# Patient Record
Sex: FEMALE | Race: WHITE | NOT HISPANIC OR LATINO | Employment: OTHER | URBAN - METROPOLITAN AREA
[De-identification: names, ages, dates, MRNs, and addresses within clinical notes are randomized per-mention and may not be internally consistent; named-entity substitution may affect disease eponyms.]

---

## 2017-03-08 ENCOUNTER — TELEPHONE (OUTPATIENT)
Dept: OBGYN | Facility: CLINIC | Age: 57
End: 2017-03-08

## 2017-03-08 ASSESSMENT — ANXIETY QUESTIONNAIRES
IF YOU CHECKED OFF ANY PROBLEMS ON THIS QUESTIONNAIRE, HOW DIFFICULT HAVE THESE PROBLEMS MADE IT FOR YOU TO DO YOUR WORK, TAKE CARE OF THINGS AT HOME, OR GET ALONG WITH OTHER PEOPLE: NOT DIFFICULT AT ALL
6. BECOMING EASILY ANNOYED OR IRRITABLE: SEVERAL DAYS
GAD7 TOTAL SCORE: 7
5. BEING SO RESTLESS THAT IT IS HARD TO SIT STILL: NOT AT ALL
1. FEELING NERVOUS, ANXIOUS, OR ON EDGE: MORE THAN HALF THE DAYS
2. NOT BEING ABLE TO STOP OR CONTROL WORRYING: MORE THAN HALF THE DAYS
7. FEELING AFRAID AS IF SOMETHING AWFUL MIGHT HAPPEN: NOT AT ALL
3. WORRYING TOO MUCH ABOUT DIFFERENT THINGS: MORE THAN HALF THE DAYS

## 2017-03-08 ASSESSMENT — PATIENT HEALTH QUESTIONNAIRE - PHQ9: 5. POOR APPETITE OR OVEREATING: NOT AT ALL

## 2017-03-08 NOTE — TELEPHONE ENCOUNTER
Reason for Call:  Medication or medication refill:    Do you use a [facility]?  Name of the pharmacy and phone number for the current request:      Name of the medication requested: patient just has a question regarding her Welbutrin script    Other request: none    Can we leave a detailed message on this number? YES    Phone number patient can be reached at: Home number on file [phone] (home)    Best Time: Patient just has a question regarding her Wellbutrin    Call taken on 3/8/2017 at 2:22 PM by Brooke Ventura

## 2017-03-08 NOTE — TELEPHONE ENCOUNTER
"Pt calling she has been taking Wellbutrin for yrs without any problem, however on Wed & Thurs she forgot to take it. Then last Friday starting having an anxiety attack that was prolonged until yesterday and yesterday it was full-blown and so bad that pt drove herself into the ER ([facility] in [place]). Sx's include: tingling from head to toe, heart palpations, chest tightness. While in the ER they checked some labs (Troponin, CBC and CMP-and stated it was abnormal and didn't know which part was abnormal). Pt stated her Troponin level was .010. BP's that were done in the ER during the anxiety attack timeframe were: 151/86, 148/72, 143/87. They sent her home with Vistaril with instructions that she can take it up to 3 times a day when starts to feel a panic attack coming on. Pt used it once last night and then went to bed for 8 solid hrs. Up until Friday anxiety had been well controlled.     Asked PHQ-9 & RACHEL-7 over the telephone.   PHQ-9 result of: 3, RACHEL-7: result of 7.     ER provider stated they wanted her to follow up with her provider who prescribed the Wellbutrin and that the pt should do a stress test. Today the anxiety has been pretty good, is \"kind of afraid of it,\" and if thinks about it can feel a little chest tightness and can usually so some self talk and then it's fine. Pt has also been taking Plexus for weight loss, a probiotic and biocleanse. Has been doing for a month with no side effects. Had one biocleanse Friday morning and Friday morning is when her heart started racing and tingling in arms/shoulders started, had another one at end day and could feel the anxiety in the middle of the night. Stopped taking the biocleanse Friday and then Sat-Sun was going in and out of waves of anxiety, smaller one in morning yesterday and by 11:00 was driving herself to the hospital. Does have some family/life issues that have been building up that hit its peak. Does have someone to talk to (so isn't keeping " bottled up inside).    Recommended to make an appt with Mirna to discuss. Transferred to scheduling to make appt for sooner rather than later. Pt scheduled appt for tomorrow (3/9/17) at 11:00 AM. Informed pt to call if has concerns come up.  Informed Mirna in person so she's aware.    Closing encounter.

## 2017-03-09 ENCOUNTER — OFFICE VISIT (OUTPATIENT)
Dept: OBGYN | Facility: CLINIC | Age: 57
End: 2017-03-09
Payer: COMMERCIAL

## 2017-03-09 VITALS
SYSTOLIC BLOOD PRESSURE: 114 MMHG | WEIGHT: 155 LBS | BODY MASS INDEX: 25.83 KG/M2 | HEIGHT: 65 IN | HEART RATE: 70 BPM | DIASTOLIC BLOOD PRESSURE: 60 MMHG

## 2017-03-09 DIAGNOSIS — F41.1 GENERALIZED ANXIETY DISORDER: Primary | ICD-10-CM

## 2017-03-09 PROCEDURE — 99214 OFFICE O/P EST MOD 30 MIN: CPT | Performed by: PHYSICIAN ASSISTANT

## 2017-03-09 RX ORDER — PAROXETINE 10 MG/1
10 TABLET, FILM COATED ORAL AT BEDTIME
Qty: 60 TABLET | Refills: 1 | Status: SHIPPED | OUTPATIENT
Start: 2017-03-09 | End: 2017-05-09 | Stop reason: SINTOL

## 2017-03-09 RX ORDER — HYDROXYZINE PAMOATE 25 MG/1
25 CAPSULE ORAL
COMMUNITY
Start: 2017-03-07 | End: 2021-03-26

## 2017-03-09 ASSESSMENT — ANXIETY QUESTIONNAIRES
2. NOT BEING ABLE TO STOP OR CONTROL WORRYING: SEVERAL DAYS
5. BEING SO RESTLESS THAT IT IS HARD TO SIT STILL: NOT AT ALL
IF YOU CHECKED OFF ANY PROBLEMS ON THIS QUESTIONNAIRE, HOW DIFFICULT HAVE THESE PROBLEMS MADE IT FOR YOU TO DO YOUR WORK, TAKE CARE OF THINGS AT HOME, OR GET ALONG WITH OTHER PEOPLE: NOT DIFFICULT AT ALL
1. FEELING NERVOUS, ANXIOUS, OR ON EDGE: SEVERAL DAYS
GAD7 TOTAL SCORE: 4
GAD7 TOTAL SCORE: 7
6. BECOMING EASILY ANNOYED OR IRRITABLE: SEVERAL DAYS
7. FEELING AFRAID AS IF SOMETHING AWFUL MIGHT HAPPEN: NOT AT ALL
3. WORRYING TOO MUCH ABOUT DIFFERENT THINGS: SEVERAL DAYS

## 2017-03-09 ASSESSMENT — PATIENT HEALTH QUESTIONNAIRE - PHQ9
SUM OF ALL RESPONSES TO PHQ QUESTIONS 1-9: 3
5. POOR APPETITE OR OVEREATING: NOT AT ALL

## 2017-03-09 NOTE — PROGRESS NOTES
SUBJECTIVE:                                                   Ann Marie Tidwell is a 56 year old female who presents to clinic today for the following health issue(s):  Patient presents with:  Anxiety: Things have gotten a little better, but wants to discuss Wellbutrin      HPI:  Here for follow up of panic attack earlier this week. She states her symptoms started later last week on Friday, but were more mild. She was able to talk herself down but continued on Saturday.  and Monday were ok, then Tuesday evening she has chest pain, SOB, racing thoughts and drove herself to the ER. Once she was told the EKG was normal, she calmed down and symptoms improved. Full work up was normal at ER.    Has a hx of depression and anxiety and started wellbutrin several years ago. Has been doing well on 300mg dose. She did forget to take if for 2 days prior to start of symptoms. Also around this time, she was taking a diet supplement that has a significant amount of caffeine in it. She had stopped this medication on Saturday, however and had the bad panic attack on Tuesday.     She does have more stress in life right now and situational anxiety. Feels that overall, her moods have shifted more in the direction of anxiety rather than depression/PMS and wonders about a different medication. Still has some intermittent hot flushes and night sweats.    Has tried both celexa and sertraline in past and recalls they stopped working after a while as she recalls.     No LMP recorded. Patient is postmenopausal..   Patient is sexually active, .  Using menopause for contraception.    reports that she has quit smoking. She does not have any smokeless tobacco history on file.    STD testing offered?  Declined    Health maintenance updated:  yes    Today's PHQ-2 Score: No flowsheet data found.  Today's PHQ-9 Score:   PHQ-9 SCORE 3/9/2017   Total Score 2     Today's RACHEL-7 Score:   RACHEL-7 SCORE 3/9/2017   Total Score 4       Problem  "list and histories reviewed & adjusted, as indicated.  Additional history: as documented.    Patient Active Problem List   Diagnosis     Osteopenia     Depression, major, recurrent, in remission (H)     Past Surgical History   Procedure Laterality Date     Laparoscopy diagnostic (gyn)       Tonsillectomy  1963      Social History   Substance Use Topics     Smoking status: Former Smoker     Smokeless tobacco: Not on file      Comment: Quit 3 years ago     Alcohol use Not on file      Problem (# of Occurrences) Relation (Name,Age of Onset)    Alzheimer Disease (1) Father    Breast Cancer (1) Mother (87)    Colon Cancer (1) Father (78): She \"thinks\" it was colon cancer, he was living in [place] at the time.    HEART DISEASE (3) Father (76): 3 stents, Mother (89): CHF,  at age 90, Brother (55): Bilateral thigh stents age 55.  Brother also carotid artery stents age 57.    OSTEOPOROSIS (1) Mother    Thyroid Disease (1) Father: Hypothyroid    Uterine Cancer (1) Mother (88)            Current Outpatient Prescriptions   Medication Sig     PARoxetine (PAXIL) 10 MG tablet Take 1 tablet (10 mg) by mouth At Bedtime     buPROPion (WELLBUTRIN XL) 300 MG 24 hr tablet Take 1 tablet (300 mg) by mouth daily     hydrOXYzine (VISTARIL) 25 MG capsule Take 25 mg by mouth Reported on 3/9/2017     No current facility-administered medications for this visit.      Allergies   Allergen Reactions     Metronidazole      No Clinical Screening - See Comments Rash     Penicillins Rash       ROS:  12 point review of systems negative other than symptoms noted below.  Constitutional: Weight Gain  Eyes: Spots  Head: Nasal Congestion  Cardiovascular: Lightheadedness and Palpitations  Gastrointestinal: Bloating  Genitourinary: Hot Flashes and Night Sweats  Neurologic: Headaches  Psychiatric: Anxiety and Difficulty Sleeping    OBJECTIVE:     /60  Pulse 70  Ht 5' 5\" (1.651 m)  Wt 155 lb (70.3 kg)  BMI 25.79 kg/m2  Body mass index is 25.79 " kg/(m^2).    Exam:  Constitutional:  Appearance: Well nourished, well developed alert, in no acute distress  Neurologic/Psychiatric:  Mental Status:  Oriented X3, affect appropriate     In-Clinic Test Results:  No results found for this or any previous visit (from the past 24 hour(s)).    ASSESSMENT/PLAN:                                                        ICD-10-CM    1. Generalized anxiety disorder F41.1 PARoxetine (PAXIL) 10 MG tablet       Suspect recent panic attack is due to several factors including the diet supplement, situational stress and possibly Wellbutrin which can worsen anxiety. Recommend decreased dose of wellbutrin to 150mg and addition of SSRI, will start with paxil 10mg daily. Will likely need to go to 20mg if tolerating well for therapeutic benefit.   Do not restart the diet supplement.   Continue with regular exercise and other lifestyle changes to help with anxiety.   Ok to take vistaril prn if has another panic attack.     25 minutes spent face to face counseling.     Mirna Martínez PA-C  [facility]

## 2017-03-10 ASSESSMENT — PATIENT HEALTH QUESTIONNAIRE - PHQ9: SUM OF ALL RESPONSES TO PHQ QUESTIONS 1-9: 2

## 2017-03-10 ASSESSMENT — ANXIETY QUESTIONNAIRES: GAD7 TOTAL SCORE: 4

## 2017-05-09 ENCOUNTER — OFFICE VISIT (OUTPATIENT)
Dept: OBGYN | Facility: CLINIC | Age: 57
End: 2017-05-09
Payer: COMMERCIAL

## 2017-05-09 VITALS — BODY MASS INDEX: 26.46 KG/M2 | WEIGHT: 159 LBS | DIASTOLIC BLOOD PRESSURE: 70 MMHG | SYSTOLIC BLOOD PRESSURE: 122 MMHG

## 2017-05-09 DIAGNOSIS — F41.1 GENERALIZED ANXIETY DISORDER: Primary | ICD-10-CM

## 2017-05-09 PROCEDURE — 99212 OFFICE O/P EST SF 10 MIN: CPT | Performed by: NURSE PRACTITIONER

## 2017-05-09 RX ORDER — VENLAFAXINE HYDROCHLORIDE 37.5 MG/1
37.5 CAPSULE, EXTENDED RELEASE ORAL DAILY
Qty: 90 CAPSULE | Refills: 3 | Status: SHIPPED | OUTPATIENT
Start: 2017-05-09 | End: 2018-01-31

## 2017-05-09 ASSESSMENT — ANXIETY QUESTIONNAIRES
2. NOT BEING ABLE TO STOP OR CONTROL WORRYING: NOT AT ALL
1. FEELING NERVOUS, ANXIOUS, OR ON EDGE: NOT AT ALL
7. FEELING AFRAID AS IF SOMETHING AWFUL MIGHT HAPPEN: NOT AT ALL
5. BEING SO RESTLESS THAT IT IS HARD TO SIT STILL: SEVERAL DAYS
IF YOU CHECKED OFF ANY PROBLEMS ON THIS QUESTIONNAIRE, HOW DIFFICULT HAVE THESE PROBLEMS MADE IT FOR YOU TO DO YOUR WORK, TAKE CARE OF THINGS AT HOME, OR GET ALONG WITH OTHER PEOPLE: NOT DIFFICULT AT ALL
3. WORRYING TOO MUCH ABOUT DIFFERENT THINGS: NOT AT ALL
6. BECOMING EASILY ANNOYED OR IRRITABLE: NOT AT ALL
GAD7 TOTAL SCORE: 1

## 2017-05-09 ASSESSMENT — PATIENT HEALTH QUESTIONNAIRE - PHQ9: 5. POOR APPETITE OR OVEREATING: NOT AT ALL

## 2017-05-09 NOTE — MR AVS SNAPSHOT
"              After Visit Summary   2017    Ann Marie Tidwell    MRN: [id number]           Patient Information     Date Of Birth          1960        Visit Information        Provider Department      2017 11:00 AM Cecilia Dai APRN CNP [facility]        Today's Diagnoses     Generalized anxiety disorder    -  1       Follow-ups after your visit        Who to contact     If you have questions or need follow up information about today's clinic visit or your schedule please contact [facility] directly at [phone].  Normal or non-critical lab and imaging results will be communicated to you by hikehart, letter or phone within 4 business days after the clinic has received the results. If you do not hear from us within 7 days, please contact the clinic through hikehart or phone. If you have a critical or abnormal lab result, we will notify you by phone as soon as possible.  Submit refill requests through [employer] or call your pharmacy and they will forward the refill request to us. Please allow 3 business days for your refill to be completed.          Additional Information About Your Visit        MyChart Information     [employer] lets you send messages to your doctor, view your test results, renew your prescriptions, schedule appointments and more. To sign up, go to [email or web address] . Click on \"Log in\" on the left side of the screen, which will take you to the Welcome page. Then click on \"Sign up Now\" on the right side of the page.     You will be asked to enter the access code listed below, as well as some personal information. Please follow the directions to create your username and password.     Your access code is: [email or web address]  Expires: 2017 12:54 PM     Your access code will  in 90 days. If you need help or a new code, please call your [place] clinic or [phone].        Care EveryWhere ID     This is your Care EveryWhere ID. This could " be used by other organizations to access your [facility] medical records  [id number]        Your Vitals Were     Breastfeeding? BMI (Body Mass Index)                No 26.46 kg/m2           Blood Pressure from Last 3 Encounters:   05/09/17 122/70   03/09/17 114/60   06/06/16 118/76    Weight from Last 3 Encounters:   05/09/17 159 lb (72.1 kg)   03/09/17 155 lb (70.3 kg)   06/06/16 146 lb (66.2 kg)              Today, you had the following     No orders found for display         Today's Medication Changes          These changes are accurate as of: 5/9/17 11:48 AM.  If you have any questions, ask your nurse or doctor.               Start taking these medicines.        Dose/Directions    venlafaxine 37.5 MG 24 hr capsule   Commonly known as:  EFFEXOR-XR   Used for:  Generalized anxiety disorder   Started by:  Cecilia Dai APRN CNP        Dose:  37.5 mg   Take 1 capsule (37.5 mg) by mouth daily   Quantity:  90 capsule   Refills:  3         Stop taking these medicines if you haven't already. Please contact your care team if you have questions.     PARoxetine 10 MG tablet   Commonly known as:  PAXIL   Stopped by:  Cecilia Dai APRN CNP                Where to get your medicines      These medications were sent to [facility] [address] [place] [address] N AT [facility] & [facility]  [address] [facility] [address]     Phone:  [phone]     venlafaxine 37.5 MG 24 hr capsule                Primary Care Provider Office Phone # Fax #    Mirna Martínez PA-C [phone]       [facility] [address] [address]        Thank you!     Thank you for choosing [facility]  for your care. Our goal is always to provide you with excellent care. Hearing back from our patients is one way we can continue to improve our services. Please take a few minutes to complete the written survey that you may receive in the  mail after your visit with us. Thank you!             Your Updated Medication List - Protect others around you: Learn how to safely use, store and throw away your medicines at www.disposemymeds.org.          This list is accurate as of: 5/9/17 11:48 AM.  Always use your most recent med list.                   Brand Name Dispense Instructions for use    buPROPion 300 MG 24 hr tablet    WELLBUTRIN XL    90 tablet    Take 1 tablet (300 mg) by mouth daily       hydrOXYzine 25 MG capsule    VISTARIL     Take 25 mg by mouth Reported on 5/9/2017       venlafaxine 37.5 MG 24 hr capsule    EFFEXOR-XR    90 capsule    Take 1 capsule (37.5 mg) by mouth daily

## 2017-05-09 NOTE — PROGRESS NOTES
"    SUBJECTIVE:                                                   Ann Marie Tidwell is a 56 year old female who presents to clinic today for the following health issue(s):  Patient presents with:  Recheck Medication      HPI:  Pt was started on paxil 10 mg back in march. Her anxiety is better, however she has noticed weight gain and this is bothersome to her.     She has tried celexa and sertraline in the past, but they stopped working for her. She is still taking wellbutrin daily. Most of her anxiety comes from stress of her 23yo daughter and college and having P.O.T.S.    She is walking, she just got a puppy, she is active. She is sleeping better now on Paxil.    No LMP recorded. Patient is postmenopausal..   Patient is sexually active, .  Using menopause for contraception.    reports that she has quit smoking. She has never used smokeless tobacco.    STD testing offered?  Declined    Health maintenance updated:  yes    Today's PHQ-2 Score: No flowsheet data found.  Today's PHQ-9 Score:   PHQ-9 SCORE 2017   Total Score 1     Today's RACHEL-7 Score:   RACHEL-7 SCORE 2017   Total Score 1       Problem list and histories reviewed & adjusted, as indicated.  Additional history: as documented.    Patient Active Problem List   Diagnosis     Osteopenia     Depression, major, recurrent, in remission (H)     Past Surgical History:   Procedure Laterality Date     LAPAROSCOPY DIAGNOSTIC (GYN)       TONSILLECTOMY  1963      Social History   Substance Use Topics     Smoking status: Former Smoker     Smokeless tobacco: Never Used      Comment: Quit 3 years ago     Alcohol use 0.0 oz/week     0 Standard drinks or equivalent per week      Problem (# of Occurrences) Relation (Name,Age of Onset)    Alzheimer Disease (1) Father    Breast Cancer (1) Mother (87)    Colon Cancer (1) Father (78): She \"thinks\" it was colon cancer, he was living in [place] at the time.    HEART DISEASE (3) Father (76): 3 stents, Mother (89): " CHF,  at age 90, Brother (55): Bilateral thigh stents age 55.  Brother also carotid artery stents age 57.    OSTEOPOROSIS (1) Mother    Thyroid Disease (1) Father: Hypothyroid    Uterine Cancer (1) Mother (88)            Current Outpatient Prescriptions   Medication Sig     venlafaxine (EFFEXOR-XR) 37.5 MG 24 hr capsule Take 1 capsule (37.5 mg) by mouth daily     buPROPion (WELLBUTRIN XL) 300 MG 24 hr tablet Take 1 tablet (300 mg) by mouth daily     hydrOXYzine (VISTARIL) 25 MG capsule Take 25 mg by mouth Reported on 2017     No current facility-administered medications for this visit.      Allergies   Allergen Reactions     Metronidazole      No Clinical Screening - See Comments Rash     Penicillins Rash       ROS:  12 point review of systems negative other than symptoms noted below.  Constitutional: Weight Gain  Head: Nasal Congestion  Cardiovascular: Lightheadedness    OBJECTIVE:     /70  Wt 159 lb (72.1 kg)  Breastfeeding? No  BMI 26.46 kg/m2  Body mass index is 26.46 kg/(m^2).    Exam:  Constitutional:  Appearance: Well nourished, well developed alert, in no acute distress  Neurologic/Psychiatric:  Mental Status:  Oriented X3      In-Clinic Test Results:  No results found for this or any previous visit (from the past 24 hour(s)).    ASSESSMENT/PLAN:                                                        ICD-10-CM    1. Generalized anxiety disorder F41.1 venlafaxine (EFFEXOR-XR) 37.5 MG 24 hr capsule       Discussed SSRI's and weight gain. Will try effexor. RTC in 2017 for annual exam.    ALEX Delatorre CNP  [facility]

## 2017-05-10 ASSESSMENT — ANXIETY QUESTIONNAIRES: GAD7 TOTAL SCORE: 1

## 2017-05-10 ASSESSMENT — PATIENT HEALTH QUESTIONNAIRE - PHQ9: SUM OF ALL RESPONSES TO PHQ QUESTIONS 1-9: 1

## 2017-06-24 ENCOUNTER — HEALTH MAINTENANCE LETTER (OUTPATIENT)
Age: 57
End: 2017-06-24

## 2017-09-10 DIAGNOSIS — F33.1 MODERATE EPISODE OF RECURRENT MAJOR DEPRESSIVE DISORDER (H): ICD-10-CM

## 2017-09-12 RX ORDER — BUPROPION HYDROCHLORIDE 300 MG/1
TABLET ORAL
Qty: 30 TABLET | Refills: 0 | Status: SHIPPED | OUTPATIENT
Start: 2017-09-12 | End: 2017-11-17

## 2017-09-12 NOTE — TELEPHONE ENCOUNTER
Bupropion 300mg      Last Written Prescription Date:  6/6/16  Last Fill Quantity: 90,   # refills: 3  Last Office Visit with [facility] primary care provider:  6/6/16  Future Office visit: none    Pt due for annual, no appt scheduled. One month extension sent per protocol.

## 2017-09-21 ENCOUNTER — TELEPHONE (OUTPATIENT)
Dept: OBGYN | Facility: CLINIC | Age: 57
End: 2017-09-21

## 2017-09-21 DIAGNOSIS — R30.0 DYSURIA: Primary | ICD-10-CM

## 2017-09-21 RX ORDER — NITROFURANTOIN 25; 75 MG/1; MG/1
100 CAPSULE ORAL 2 TIMES DAILY
Qty: 14 CAPSULE | Refills: 0 | Status: SHIPPED | OUTPATIENT
Start: 2017-09-21 | End: 2018-01-31

## 2017-09-21 NOTE — TELEPHONE ENCOUNTER
Reason for Call:  Other call back    Detailed comments: Patient would like to speak to Cecilia about meds    Phone Number Patient can be reached at: Cell number on file:    Telephone Information:   Mobile [phone]       Best Time: today    Can we leave a detailed message on this number? YES    Call taken on 9/21/2017 at 12:56 PM by Brooke Ventura

## 2017-09-21 NOTE — TELEPHONE ENCOUNTER
Pt called with burning with urination, urgency and this morning she noticed some blood in her urine.     She fell and broke her arm on 9/5 and had surgery last week. She tried to get a hold of her surgeon but was told he was running behind and she may not hear back until tomorrow. She is requesting abx today.Encouraged her to increase her water intake, I will send over abx and she has her post op with her MD on 10/2/17 she will have her urine checked then. She is to call with any fever/chills.

## 2017-10-12 DIAGNOSIS — F33.1 MODERATE EPISODE OF RECURRENT MAJOR DEPRESSIVE DISORDER (H): ICD-10-CM

## 2017-10-12 RX ORDER — BUPROPION HYDROCHLORIDE 300 MG/1
TABLET ORAL
Qty: 30 TABLET | Refills: 0 | OUTPATIENT
Start: 2017-10-12

## 2017-10-12 NOTE — TELEPHONE ENCOUNTER
buPROPion (WELLBUTRIN XL) 300 MG 24 hr tablet     Last Written Prescription Date:  9/12/17  Last Fill Quantity: 30,   # refills: 0  Last Office Visit with [facility] primary care provider:  6/6/16 Annual  Future Office visit: none    Routing refill request to provider for review/approval because:  Refill denied. Pt has received one month extension, no appointment made for annual

## 2017-11-17 ENCOUNTER — TELEPHONE (OUTPATIENT)
Dept: NURSING | Facility: CLINIC | Age: 57
End: 2017-11-17

## 2017-11-17 DIAGNOSIS — F33.1 MODERATE EPISODE OF RECURRENT MAJOR DEPRESSIVE DISORDER (H): ICD-10-CM

## 2017-11-17 RX ORDER — BUPROPION HYDROCHLORIDE 300 MG/1
TABLET ORAL
Qty: 30 TABLET | Refills: 0 | OUTPATIENT
Start: 2017-11-17

## 2017-11-17 RX ORDER — BUPROPION HYDROCHLORIDE 300 MG/1
TABLET ORAL
Qty: 30 TABLET | Refills: 0 | Status: SHIPPED | OUTPATIENT
Start: 2017-11-17 | End: 2018-01-31

## 2017-11-17 NOTE — TELEPHONE ENCOUNTER
Bupropion 300mg      Last Written Prescription Date:  9/12/17  Last Fill Quantity: 30,   # refills: 0  Last Office Visit with [facility] primary care provider:  6/6/16  Future Office visit: none    Pt due for annual, no appt scheduled. Pt already received one month extension. Rx denied.

## 2017-11-17 NOTE — TELEPHONE ENCOUNTER
Pt requesting refill of wellbutrin. Pt informed that she is overdue for annual and needs appointment. Pt transferred to scheduling to make appointment. Refill sent. Appointment 12/12/17

## 2017-12-16 ENCOUNTER — HEALTH MAINTENANCE LETTER (OUTPATIENT)
Age: 57
End: 2017-12-16

## 2017-12-23 ENCOUNTER — HEALTH MAINTENANCE LETTER (OUTPATIENT)
Age: 57
End: 2017-12-23

## 2017-12-23 DIAGNOSIS — F33.1 MODERATE EPISODE OF RECURRENT MAJOR DEPRESSIVE DISORDER (H): ICD-10-CM

## 2017-12-26 RX ORDER — BUPROPION HYDROCHLORIDE 300 MG/1
TABLET ORAL
Qty: 30 TABLET | Refills: 0 | OUTPATIENT
Start: 2017-12-26

## 2017-12-26 NOTE — TELEPHONE ENCOUNTER
buPROPion (WELLBUTRIN XL) 300 MG 24 hr tablet     Last Written Prescription Date:  11/17/17  Last Fill Quantity: 30,   # refills: 0  Last Office Visit with [facility] primary care provider:  6/6/16  Future Office visit: 12/29/17    Routing refill request to provider for review/approval because:  Pt can get refill at appointment 12/29/17. Refill denied.

## 2018-01-31 ENCOUNTER — RADIANT APPOINTMENT (OUTPATIENT)
Dept: MAMMOGRAPHY | Facility: CLINIC | Age: 58
End: 2018-01-31
Payer: COMMERCIAL

## 2018-01-31 ENCOUNTER — OFFICE VISIT (OUTPATIENT)
Dept: OBGYN | Facility: CLINIC | Age: 58
End: 2018-01-31
Payer: COMMERCIAL

## 2018-01-31 VITALS
SYSTOLIC BLOOD PRESSURE: 118 MMHG | HEIGHT: 65 IN | HEART RATE: 80 BPM | BODY MASS INDEX: 26.99 KG/M2 | DIASTOLIC BLOOD PRESSURE: 74 MMHG | WEIGHT: 162 LBS

## 2018-01-31 DIAGNOSIS — Z12.31 VISIT FOR SCREENING MAMMOGRAM: ICD-10-CM

## 2018-01-31 DIAGNOSIS — F33.1 MODERATE EPISODE OF RECURRENT MAJOR DEPRESSIVE DISORDER (H): ICD-10-CM

## 2018-01-31 DIAGNOSIS — Z12.11 SCREEN FOR COLON CANCER: ICD-10-CM

## 2018-01-31 DIAGNOSIS — Z01.419 ENCOUNTER FOR GYNECOLOGICAL EXAMINATION WITHOUT ABNORMAL FINDING: Primary | ICD-10-CM

## 2018-01-31 DIAGNOSIS — F41.1 GENERALIZED ANXIETY DISORDER: ICD-10-CM

## 2018-01-31 PROCEDURE — G0145 SCR C/V CYTO,THINLAYER,RESCR: HCPCS | Performed by: NURSE PRACTITIONER

## 2018-01-31 PROCEDURE — 99396 PREV VISIT EST AGE 40-64: CPT | Performed by: NURSE PRACTITIONER

## 2018-01-31 PROCEDURE — 77067 SCR MAMMO BI INCL CAD: CPT | Mod: TC

## 2018-01-31 PROCEDURE — 77063 BREAST TOMOSYNTHESIS BI: CPT | Mod: TC

## 2018-01-31 PROCEDURE — 87624 HPV HI-RISK TYP POOLED RSLT: CPT | Performed by: NURSE PRACTITIONER

## 2018-01-31 RX ORDER — BUPROPION HYDROCHLORIDE 150 MG/1
TABLET ORAL
Qty: 90 TABLET | Refills: 3 | Status: SHIPPED | OUTPATIENT
Start: 2018-01-31 | End: 2019-03-19

## 2018-01-31 RX ORDER — VENLAFAXINE HYDROCHLORIDE 37.5 MG/1
37.5 CAPSULE, EXTENDED RELEASE ORAL DAILY
Qty: 90 CAPSULE | Refills: 3 | Status: SHIPPED | OUTPATIENT
Start: 2018-01-31 | End: 2019-02-15

## 2018-01-31 ASSESSMENT — ANXIETY QUESTIONNAIRES
GAD7 TOTAL SCORE: 0
3. WORRYING TOO MUCH ABOUT DIFFERENT THINGS: NOT AT ALL
7. FEELING AFRAID AS IF SOMETHING AWFUL MIGHT HAPPEN: NOT AT ALL
IF YOU CHECKED OFF ANY PROBLEMS ON THIS QUESTIONNAIRE, HOW DIFFICULT HAVE THESE PROBLEMS MADE IT FOR YOU TO DO YOUR WORK, TAKE CARE OF THINGS AT HOME, OR GET ALONG WITH OTHER PEOPLE: NOT DIFFICULT AT ALL
5. BEING SO RESTLESS THAT IT IS HARD TO SIT STILL: NOT AT ALL
6. BECOMING EASILY ANNOYED OR IRRITABLE: NOT AT ALL
1. FEELING NERVOUS, ANXIOUS, OR ON EDGE: NOT AT ALL
2. NOT BEING ABLE TO STOP OR CONTROL WORRYING: NOT AT ALL

## 2018-01-31 ASSESSMENT — PATIENT HEALTH QUESTIONNAIRE - PHQ9: 5. POOR APPETITE OR OVEREATING: NOT AT ALL

## 2018-01-31 NOTE — MR AVS SNAPSHOT
"              After Visit Summary   1/31/2018    Ann Marie Tidwell    MRN: [id number]           Patient Information     Date Of Birth          1960        Visit Information        Provider Department      1/31/2018 1:30 PM Cecilia Dai APRN CNP [facility]        Today's Diagnoses     Encounter for gynecological examination without abnormal finding    -  1    Moderate episode of recurrent major depressive disorder (H)        Generalized anxiety disorder        Screen for colon cancer           Follow-ups after your visit        Follow-up notes from your care team     Return in about 1 year (around 1/31/2019).      Future tests that were ordered for you today     Open Future Orders        Priority Expected Expires Ordered    Fecal colorectal cancer screen (FIT) Routine 2/21/2018 4/25/2018 1/31/2018            Who to contact     If you have questions or need follow up information about today's clinic visit or your schedule please contact [facility] directly at [phone].  Normal or non-critical lab and imaging results will be communicated to you by [employer], letter or phone within 4 business days after the clinic has received the results. If you do not hear from us within 7 days, please contact the clinic through [employer] or phone. If you have a critical or abnormal lab result, we will notify you by phone as soon as possible.  Submit refill requests through [employer] or call your pharmacy and they will forward the refill request to us. Please allow 3 business days for your refill to be completed.          Additional Information About Your Visit        [employer] Information     [employer] lets you send messages to your doctor, view your test results, renew your prescriptions, schedule appointments and more. To sign up, go to [email or web address] . Click on \"Log in\" on the left side of the screen, which will take you to the Welcome page. Then click on \"Sign up Now\" on the " "right side of the page.     You will be asked to enter the access code listed below, as well as some personal information. Please follow the directions to create your username and password.     Your access code is: [email or web address]  Expires: 2018  2:40 PM     Your access code will  in 90 days. If you need help or a new code, please call your [facility] or [phone].        Care EveryWhere ID     This is your Care EveryWhere ID. This could be used by other organizations to access your [place] medical records  [id number]        Your Vitals Were     Pulse Height Breastfeeding? BMI (Body Mass Index)          80 5' 4.5\" (1.638 m) No 27.38 kg/m2         Blood Pressure from Last 3 Encounters:   18 118/74   17 122/70   17 114/60    Weight from Last 3 Encounters:   18 162 lb (73.5 kg)   17 159 lb (72.1 kg)   17 155 lb (70.3 kg)              We Performed the Following     HPV High Risk Types DNA Cervical     Pap imaged thin layer screen with HPV - recommended age 30 - 65          Today's Medication Changes          These changes are accurate as of 18  2:40 PM.  If you have any questions, ask your nurse or doctor.               These medicines have changed or have updated prescriptions.        Dose/Directions    buPROPion 150 MG 24 hr tablet   Commonly known as:  WELLBUTRIN XL   This may have changed:    - medication strength  - additional instructions   Used for:  Moderate episode of recurrent major depressive disorder (H)   Changed by:  Cecilia Dai APRN CNP        TAKE 1 TABLET(150 MG) BY MOUTH DAILY   Quantity:  90 tablet   Refills:  3            Where to get your medicines      These medications were sent to [facility] [address] [facility] [place] - [address] AT [facility]  [address], [facility] [address]     Phone:  [phone]     buPROPion 150 MG 24 hr tablet    venlafaxine 37.5 MG 24 hr capsule                " Primary Care Provider Office Phone # Fax #    [facility] [phone]       [facility] [address]  [facility] [address]        Equal Access to Services     CHILO VO : Hadii sharlene slater hadlouiseo Soomaali, waaxda luqadaha, qaybta kaalmada adeegyada, brandy jeanie charlotten tremaine frankiremberto asencio. So [facility] [phone].    ATENCIÓN: Si habla español, tiene a rothman disposición servicios gratuitos de asistencia lingüística. Llame al [phone].    We comply with applicable federal civil rights laws and [place] laws. We do not discriminate on the basis of race, color, national origin, age, disability, sex, sexual orientation, or gender identity.            Thank you!     Thank you for choosing [facility]  for your care. Our goal is always to provide you with excellent care. Hearing back from our patients is one way we can continue to improve our services. Please take a few minutes to complete the written survey that you may receive in the mail after your visit with us. Thank you!             Your Updated Medication List - Protect others around you: Learn how to safely use, store and throw away your medicines at www.disposemymeds.org.          This list is accurate as of 1/31/18  2:40 PM.  Always use your most recent med list.                   Brand Name Dispense Instructions for use Diagnosis    buPROPion 150 MG 24 hr tablet    WELLBUTRIN XL    90 tablet    TAKE 1 TABLET(150 MG) BY MOUTH DAILY    Moderate episode of recurrent major depressive disorder (H)       hydrOXYzine 25 MG capsule    VISTARIL     Take 25 mg by mouth Reported on 5/9/2017        venlafaxine 37.5 MG 24 hr capsule    EFFEXOR-XR    90 capsule    Take 1 capsule (37.5 mg) by mouth daily    Generalized anxiety disorder

## 2018-01-31 NOTE — LETTER
February 7, 2018    Ann Marie Tidwell  [address]  [place] [address]    Dear Ann Marie,  We are happy to inform you that your PAP smear result from 1/31/18 is normal.  We are now able to do a follow up test on PAP smears. The DNA test is for HPV (Human Papilloma Virus). Cervical cancer is closely linked with certain types of HPV. Your result showed no evidence of high risk HPV.  Therefore we recommend you return in 1 year for your next pap smear.  You will still need to return to the clinic every year for an annual exam and other preventive tests.  Please contact the clinic at [phone] with any questions.  Sincerely,    Cecilia Dai, ALEX CNP/rlm

## 2018-02-01 ASSESSMENT — ANXIETY QUESTIONNAIRES: GAD7 TOTAL SCORE: 0

## 2018-02-01 ASSESSMENT — PATIENT HEALTH QUESTIONNAIRE - PHQ9: SUM OF ALL RESPONSES TO PHQ QUESTIONS 1-9: 0

## 2018-02-02 LAB
COPATH REPORT: NORMAL
PAP: NORMAL

## 2018-02-06 LAB
FINAL DIAGNOSIS: NORMAL
HPV HR 12 DNA CVX QL NAA+PROBE: NEGATIVE
HPV16 DNA SPEC QL NAA+PROBE: NEGATIVE
HPV18 DNA SPEC QL NAA+PROBE: NEGATIVE
SPECIMEN DESCRIPTION: NORMAL
SPECIMEN SOURCE CVX/VAG CYTO: NORMAL

## 2018-05-31 ENCOUNTER — TELEPHONE (OUTPATIENT)
Dept: NURSING | Facility: CLINIC | Age: 58
End: 2018-05-31

## 2018-05-31 DIAGNOSIS — R30.0 DYSURIA: Primary | ICD-10-CM

## 2018-05-31 RX ORDER — NITROFURANTOIN 25; 75 MG/1; MG/1
100 CAPSULE ORAL 2 TIMES DAILY
Qty: 14 CAPSULE | Refills: 0 | Status: SHIPPED | OUTPATIENT
Start: 2018-05-31 | End: 2018-09-06

## 2018-05-31 NOTE — TELEPHONE ENCOUNTER
"Pt requesting rx for her \"raging bladder infection\" SHe said that she woke up with dysuria, blood in urine, low back pain, frequency.  Pt aware that she may need to come in for urine lab appt. ( [facility] [address])  Routing to Cecilia RAMIREZ to advise  "

## 2018-05-31 NOTE — TELEPHONE ENCOUNTER
Called pt back. Will rx today. If symptoms persist or worsen she needs to be seen. Encouraged increase H2O intake

## 2018-09-05 ENCOUNTER — TELEPHONE (OUTPATIENT)
Dept: OBGYN | Facility: CLINIC | Age: 58
End: 2018-09-05

## 2018-09-05 DIAGNOSIS — R30.0 DYSURIA: ICD-10-CM

## 2018-09-05 NOTE — TELEPHONE ENCOUNTER
Reason for call:  Patient reporting a symptom    Symptom or request: Patient is having UTI symptoms and is hoping you can give her a script without coming in. She will be scheduling a pre-op next week.    Duration (how long have symptoms been present):2 days    Have you been treated for this before? Yes    Additional comments: none    Phone Number patient can be reached at:  Cell number on file:    Telephone Information:   Mobile [phone]       Best Time:  today    Can we leave a detailed message on this number:  YES    Call taken on 9/5/2018 at 3:46 PM by Brooke Ventura

## 2018-09-06 RX ORDER — NITROFURANTOIN 25; 75 MG/1; MG/1
100 CAPSULE ORAL 2 TIMES DAILY
Qty: 14 CAPSULE | Refills: 0 | Status: SHIPPED | OUTPATIENT
Start: 2018-09-06 | End: 2019-07-11

## 2018-09-06 NOTE — TELEPHONE ENCOUNTER
Called pt back.     Symptoms started Monday. She's noticed blood in urine, frequency, lower back pain, chills.     Has been taking D-Mannose which has helped a bit but symptoms are still nagging.     RX sent. If symptoms return after abx she needs to be seen.

## 2018-09-13 ENCOUNTER — TRANSFERRED RECORDS (OUTPATIENT)
Dept: HEALTH INFORMATION MANAGEMENT | Facility: CLINIC | Age: 58
End: 2018-09-13

## 2018-09-25 ENCOUNTER — OFFICE VISIT (OUTPATIENT)
Dept: OBGYN | Facility: CLINIC | Age: 58
End: 2018-09-25
Payer: COMMERCIAL

## 2018-09-25 VITALS
HEIGHT: 64 IN | WEIGHT: 163 LBS | HEART RATE: 68 BPM | BODY MASS INDEX: 27.83 KG/M2 | SYSTOLIC BLOOD PRESSURE: 118 MMHG | DIASTOLIC BLOOD PRESSURE: 74 MMHG

## 2018-09-25 DIAGNOSIS — L65.9 HAIR LOSS: ICD-10-CM

## 2018-09-25 DIAGNOSIS — S63.591S: ICD-10-CM

## 2018-09-25 DIAGNOSIS — Z01.818 PREOP EXAMINATION: Primary | ICD-10-CM

## 2018-09-25 PROCEDURE — 36415 COLL VENOUS BLD VENIPUNCTURE: CPT | Performed by: NURSE PRACTITIONER

## 2018-09-25 PROCEDURE — 84443 ASSAY THYROID STIM HORMONE: CPT | Performed by: NURSE PRACTITIONER

## 2018-09-25 PROCEDURE — 82728 ASSAY OF FERRITIN: CPT | Performed by: NURSE PRACTITIONER

## 2018-09-25 PROCEDURE — 99213 OFFICE O/P EST LOW 20 MIN: CPT | Performed by: NURSE PRACTITIONER

## 2018-09-25 RX ORDER — BUPROPION HYDROCHLORIDE 300 MG/1
TABLET ORAL
Refills: 0 | COMMUNITY
Start: 2017-11-17 | End: 2019-07-11

## 2018-09-25 NOTE — LETTER
9/26/2018     Ann Marie Tidwell  [address]  [place] [address]        Ann Marie Tidwell your lab results came back normal. Your thyroid and iron level were perfect.      Results for orders placed or performed in visit on 09/25/18   TSH with free T4 reflex   Result Value Ref Range    TSH 1.31 0.40 - 4.00 mU/L   Ferritin   Result Value Ref Range    Ferritin 98 8 - 252 ng/mL         Cristino,    ALEX Delatorre CNP

## 2018-09-25 NOTE — MR AVS SNAPSHOT
"              After Visit Summary   2018    Ann Marie Tidwell    MRN: [id number]           Patient Information     Date Of Birth          1960        Visit Information        Provider Department      2018 2:00 PM Cecilia Dai APRN CNP [facility]        Today's Diagnoses     Preop examination    -  1    Sprain of ulnar collateral ligament of right wrist, sequela        Hair loss           Follow-ups after your visit        Who to contact     If you have questions or need follow up information about today's clinic visit or your schedule please contact [facility] directly at [phone].  Normal or non-critical lab and imaging results will be communicated to you by [employer], letter or phone within 4 business days after the clinic has received the results. If you do not hear from us within 7 days, please contact the clinic through [employer] or phone. If you have a critical or abnormal lab result, we will notify you by phone as soon as possible.  Submit refill requests through [employer] or call your pharmacy and they will forward the refill request to us. Please allow 3 business days for your refill to be completed.          Additional Information About Your Visit        MyChart Information     [employer] lets you send messages to your doctor, view your test results, renew your prescriptions, schedule appointments and more. To sign up, go to [email or web address] . Click on \"Log in\" on the left side of the screen, which will take you to the Welcome page. Then click on \"Sign up Now\" on the right side of the page.     You will be asked to enter the access code listed below, as well as some personal information. Please follow the directions to create your username and password.     Your access code is: [email or web address]  Expires: 2018  2:08 PM     Your access code will  in 90 days. If you need help or a new code, please call your [place] clinic or " "[phone].        Care EveryWhere ID     This is your Care EveryWhere ID. This could be used by other organizations to access your [place] medical records  [id number]        Your Vitals Were     Pulse Height BMI (Body Mass Index)             68 5' 4\" (1.626 m) 27.98 kg/m2          Blood Pressure from Last 3 Encounters:   09/25/18 118/74   01/31/18 118/74   05/09/17 122/70    Weight from Last 3 Encounters:   09/25/18 163 lb (73.9 kg)   01/31/18 162 lb (73.5 kg)   05/09/17 159 lb (72.1 kg)              We Performed the Following     Ferritin     TSH with free T4 reflex        Primary Care Provider Office Phone # Fax #    [facility] [phone]       [place] [address]  [facility] [address]  [facility] [address]        Equal Access to Services     CHILO VO AH: Hadii sharlene ku hadasho Soomaali, waaxda luqadaha, qaybta kaalmada adeegyada, brandy corbett . So [facility] [phone].    ATENCIÓN: Si adolfola español, tiene a rothman disposición servicios gratuitos de asistencia lingüística. Llame al [phone].    We comply with applicable federal civil rights laws and [place] laws. We do not discriminate on the basis of race, color, national origin, age, disability, sex, sexual orientation, or gender identity.            Thank you!     Thank you for choosing [facility]  for your care. Our goal is always to provide you with excellent care. Hearing back from our patients is one way we can continue to improve our services. Please take a few minutes to complete the written survey that you may receive in the mail after your visit with us. Thank you!             Your Updated Medication List - Protect others around you: Learn how to safely use, store and throw away your medicines at www.disposemymeds.org.          This list is accurate as of 9/25/18  3:58 PM.  Always use your most recent med list.                   Brand " Name Dispense Instructions for use Diagnosis    * buPROPion 300 MG 24 hr tablet    WELLBUTRIN XL          * buPROPion 150 MG 24 hr tablet    WELLBUTRIN XL    90 tablet    TAKE 1 TABLET(150 MG) BY MOUTH DAILY    Moderate episode of recurrent major depressive disorder (H)       hydrOXYzine 25 MG capsule    VISTARIL     Take 25 mg by mouth Reported on 5/9/2017        nitroFURantoin (macrocrystal-monohydrate) 100 MG capsule    MACROBID    14 capsule    Take 1 capsule (100 mg) by mouth 2 times daily    Dysuria       venlafaxine 37.5 MG 24 hr capsule    EFFEXOR-XR    90 capsule    Take 1 capsule (37.5 mg) by mouth daily    Generalized anxiety disorder       * Notice:  This list has 2 medication(s) that are the same as other medications prescribed for you. Read the directions carefully, and ask your doctor or other care provider to review them with you.

## 2018-09-25 NOTE — PROGRESS NOTES
"    SUBJECTIVE:                                                   Ann Marie Tidwell is a 58 year old female who presents to clinic today for the following health issue(s):  Patient presents with:  Pre-Op Exam      HPI:  Pt here today for a preop exam for a right wrist surgery she has planned with Dr. Walker on 2018. She is having a DRUJ-ligament reconstruction on her ulna and a styloid excision.  She had the same procedure done in 2017 with no complications. Denies any post op N/V or major issues with constipation.     Unrelated she is requesting testing for thyroid and iron. She has noticed her hair is falling out, her skin is dryer and her palms are itchy.     No LMP recorded. Patient is postmenopausal..   Patient is sexually active, .  Using menopause for contraception.    reports that she has quit smoking. She has never used smokeless tobacco.    STD testing offered?  Declined    Health maintenance updated:  yes    Today's PHQ-2 Score: No flowsheet data found.  Today's PHQ-9 Score:   PHQ-9 SCORE 2018   Total Score 0     Today's RACHEL-7 Score:   RACHEL-7 SCORE 2018   Total Score 0       Problem list and histories reviewed & adjusted, as indicated.  Additional history: as documented.    Patient Active Problem List   Diagnosis     Osteopenia     Depression, major, recurrent, in remission (H)     Past Surgical History:   Procedure Laterality Date     LAPAROSCOPY DIAGNOSTIC (GYN)       TONSILLECTOMY  1963      Social History   Substance Use Topics     Smoking status: Former Smoker     Smokeless tobacco: Never Used      Comment: Quit 3 years ago     Alcohol use 0.0 oz/week     0 Standard drinks or equivalent per week      Problem (# of Occurrences) Relation (Name,Age of Onset)    Alzheimer Disease (1) Father    Breast Cancer (1) Mother (87)    Colon Cancer (1) Father (78): She \"thinks\" it was colon cancer, he was living in [place] at the time.    HEART DISEASE (3) Father (76): 3 stents, " "Mother (89): CHF,  at age 90, Brother (55): Bilateral thigh stents age 55.  Brother also carotid artery stents age 57.    Osteoporosis (1) Mother    Thyroid Disease (1) Father: Hypothyroid    Uterine Cancer (1) Mother (88)            Current Outpatient Prescriptions   Medication Sig     buPROPion (WELLBUTRIN XL) 150 MG 24 hr tablet TAKE 1 TABLET(150 MG) BY MOUTH DAILY     buPROPion (WELLBUTRIN XL) 300 MG 24 hr tablet      hydrOXYzine (VISTARIL) 25 MG capsule Take 25 mg by mouth Reported on 2017     nitroFURantoin, macrocrystal-monohydrate, (MACROBID) 100 MG capsule Take 1 capsule (100 mg) by mouth 2 times daily     venlafaxine (EFFEXOR-XR) 37.5 MG 24 hr capsule Take 1 capsule (37.5 mg) by mouth daily     No current facility-administered medications for this visit.      Allergies   Allergen Reactions     Metronidazole      No Clinical Screening - See Comments Rash     Penicillins Rash       ROS:  12 point review of systems negative other than symptoms noted below.  Constitutional: Weight Gain  Head: Nasal Congestion and Sore Throat  Skin: Skin Dryness  Musculoskeletal: Joint Pain    OBJECTIVE:     /74  Pulse 68  Ht 5' 4\" (1.626 m)  Wt 163 lb (73.9 kg)  BMI 27.98 kg/m2  Body mass index is 27.98 kg/(m^2).    Exam:  Constitutional:  Appearance: Well nourished, well developed alert, in no acute distress  Neck:  Lymph Nodes:  No lymphadenopathy present; Thyroid:  Gland size normal, nontender, no nodules or masses present on palpation  Chest:  Respiratory Effort:  Breathing unlabored  Cardiovascular: Heart: Auscultation:  Regular rate, normal rhythm, no murmurs present  Lymphatic: Lymph Nodes:  No other lymphadenopathy present  Neurologic/Psychiatric:  Mental Status:  Oriented X3   No Pelvic Exam performed     In-Clinic Test Results:  No results found for this or any previous visit (from the past 24 hour(s)).    ASSESSMENT/PLAN:                                                        ICD-10-CM    1. Preop " examination Z01.818    2. Sprain of ulnar collateral ligament of right wrist, sequela S63.251S    3. Hair loss L65.9 TSH with free T4 reflex     Ferritin         Pt cleared for surgery. Consent per DR. Walker.    Will update her on her labs.     ALEX Delatorre [facility] FOR [facility]

## 2018-09-26 LAB
FERRITIN SERPL-MCNC: 98 NG/ML (ref 8–252)
TSH SERPL DL<=0.005 MIU/L-ACNC: 1.31 MU/L (ref 0.4–4)

## 2019-02-15 DIAGNOSIS — F41.1 GENERALIZED ANXIETY DISORDER: ICD-10-CM

## 2019-02-18 RX ORDER — VENLAFAXINE HYDROCHLORIDE 37.5 MG/1
CAPSULE, EXTENDED RELEASE ORAL
Qty: 30 CAPSULE | Refills: 0 | Status: SHIPPED | OUTPATIENT
Start: 2019-02-18 | End: 2019-05-06

## 2019-02-18 NOTE — TELEPHONE ENCOUNTER
"Requested Prescriptions   Pending Prescriptions Disp Refills     venlafaxine (EFFEXOR-XR) 37.5 MG 24 hr capsule [Pharmacy Med Name: VENLAFAXINE ER 37.5MG CAPSULES] 90 capsule 0     Sig: TAKE 1 CAPSULE(37.5 MG) BY MOUTH DAILY    Serotonin-Norepinephrine Reuptake Inhibitors  Failed - 2/15/2019  5:13 PM       Failed - Normal serum creatinine on file in past 12 months    No lab results found.         Passed - Blood pressure under 140/90 in past 12 months    BP Readings from Last 3 Encounters:   09/25/18 118/74   01/31/18 118/74   05/09/17 122/70                Passed - Recent (12 mo) or future (30 days) visit within the authorizing provider's specialty    Patient had office visit in the last 12 months or has a visit in the next 30 days with authorizing provider or within the authorizing provider's specialty.  See \"Patient Info\" tab in inbasket, or \"Choose Columns\" in Meds & Orders section of the refill encounter.             Passed - Medication is active on med list       Passed - Patient is age 18 or older       Passed - No active pregnancy on record       Passed - No positive pregnancy test in past 12 months        Last Written Prescription Date:  1/31/18  Last Fill Quantity: 90,  # refills: 3   Last office visit: 9/25/2018 with prescribing provider:  Cecilia Dai   Future Office Visit: none    Medication is being filled for 1 time refill only due to:  pt needs an appointment for further refills   Tamara Gonzalez RN on 2/18/2019 at 11:44 AM            "

## 2019-03-19 DIAGNOSIS — F33.1 MODERATE EPISODE OF RECURRENT MAJOR DEPRESSIVE DISORDER (H): ICD-10-CM

## 2019-03-19 RX ORDER — BUPROPION HYDROCHLORIDE 150 MG/1
TABLET ORAL
Qty: 30 TABLET | Refills: 0 | Status: SHIPPED | OUTPATIENT
Start: 2019-03-19 | End: 2019-07-11

## 2019-03-19 NOTE — TELEPHONE ENCOUNTER
"Requested Prescriptions   Pending Prescriptions Disp Refills     buPROPion (WELLBUTRIN XL) 150 MG 24 hr tablet [Pharmacy Med Name: &BUPROPION 150MG ER TAB] 90 tablet 0     Sig: TAKE 1 TABLET(150 MG) BY MOUTH DAILY    SSRIs Protocol Failed - 3/19/2019 12:16 PM       Failed - PHQ-9 score less than 5 in past 6 months    Please review last PHQ-9 score.          Passed - Medication is Bupropion    If the medication is Bupropion (Wellbutrin), and the patient is taking for smoking cessation; OK to refill.         Passed - Medication is active on med list       Passed - Patient is age 18 or older       Passed - No active pregnancy on record       Passed - No positive pregnancy test in last 12 months       Passed - Recent (6 mo) or future (30 days) visit within the authorizing provider's specialty    Patient had office visit in the last 6 months or has a visit in the next 30 days with authorizing provider or within the authorizing provider's specialty.  See \"Patient Info\" tab in inbasket, or \"Choose Columns\" in Meds & Orders section of the refill encounter.            Medication is being filled for 1 time refill only due to:  pt is due for an annual exam   Tamara Gonzalez RN on 3/19/2019 at 12:34 PM      "

## 2019-04-21 DIAGNOSIS — F41.1 GENERALIZED ANXIETY DISORDER: ICD-10-CM

## 2019-04-21 DIAGNOSIS — F33.1 MODERATE EPISODE OF RECURRENT MAJOR DEPRESSIVE DISORDER (H): ICD-10-CM

## 2019-04-22 NOTE — TELEPHONE ENCOUNTER
"Requested Prescriptions   Pending Prescriptions Disp Refills     venlafaxine (EFFEXOR-XR) 37.5 MG 24 hr capsule [Pharmacy Med Name: VENLAFAXINE ER 37.5MG CAPSULES] 30 capsule 0     Sig: TAKE 1 CAPSULE(37.5 MG) BY MOUTH DAILY       Serotonin-Norepinephrine Reuptake Inhibitors  Failed - 4/21/2019  8:55 PM        Failed - PHQ-9 score of less than 5 in past 6 months     Please review last PHQ-9 score.           Failed - Normal serum creatinine on file in past 12 months     No lab results found.          Failed - Recent (6 mo) or future (30 days) visit within the authorizing provider's specialty     Patient had office visit in the last 6 months or has a visit in the next 30 days with authorizing provider or within the authorizing provider's specialty.  See \"Patient Info\" tab in inbasket, or \"Choose Columns\" in Meds & Orders section of the refill encounter.            Passed - Blood pressure under 140/90 in past 12 months     BP Readings from Last 3 Encounters:   09/25/18 118/74   01/31/18 118/74   05/09/17 122/70                 Passed - Medication is active on med list        Passed - Patient is age 18 or older        Passed - No active pregnancy on record        Passed - No positive pregnancy test in past 12 months        buPROPion (WELLBUTRIN XL) 150 MG 24 hr tablet [Pharmacy Med Name: BUPROPION XL 150MG TABLETS (24 H)] 30 tablet 0     Sig: TAKE 1 TABLET BY MOUTH EVERY DAY       SSRIs Protocol Failed - 4/21/2019  8:55 PM        Failed - PHQ-9 score less than 5 in past 6 months     Please review last PHQ-9 score.           Failed - Recent (6 mo) or future (30 days) visit within the authorizing provider's specialty     Patient had office visit in the last 6 months or has a visit in the next 30 days with authorizing provider or within the authorizing provider's specialty.  See \"Patient Info\" tab in inbasket, or \"Choose Columns\" in Meds & Orders section of the refill encounter.            Passed - Medication is Bupropion "     If the medication is Bupropion (Wellbutrin), and the patient is taking for smoking cessation; OK to refill.          Passed - Medication is active on med list        Passed - Patient is age 18 or older        Passed - No active pregnancy on record        Passed - No positive pregnancy test in last 12 months        VENLAFAXINE  Last Written Prescription Date:  2/18/19  Last Fill Quantity: 30,  # refills: 0   Last office visit: 9/25/2018 with prescribing provider:  Cecilia Saeed Office Visit:  none    BUPROPION  Last Written Prescription Date:  3/19/19  Last Fill Quantity: 30,  # refills: 0   Last office visit: 9/25/2018 with prescribing provider:  Cecilia Saeed Office Visit:  none    Pt due for annual, no appt scheduled. Pt already received one month extension. Rx denied.   Tamara Gonzalez RN on 4/23/2019 at 9:10 AM

## 2019-04-23 RX ORDER — BUPROPION HYDROCHLORIDE 150 MG/1
TABLET ORAL
Qty: 30 TABLET | Refills: 0 | OUTPATIENT
Start: 2019-04-23

## 2019-04-23 RX ORDER — VENLAFAXINE HYDROCHLORIDE 37.5 MG/1
CAPSULE, EXTENDED RELEASE ORAL
Qty: 30 CAPSULE | Refills: 0 | OUTPATIENT
Start: 2019-04-23

## 2019-05-03 DIAGNOSIS — F41.1 GENERALIZED ANXIETY DISORDER: ICD-10-CM

## 2019-05-03 RX ORDER — VENLAFAXINE HYDROCHLORIDE 37.5 MG/1
CAPSULE, EXTENDED RELEASE ORAL
Qty: 30 CAPSULE | Refills: 0 | OUTPATIENT
Start: 2019-05-03

## 2019-05-03 NOTE — TELEPHONE ENCOUNTER
"Requested Prescriptions   Pending Prescriptions Disp Refills     venlafaxine (EFFEXOR-XR) 37.5 MG 24 hr capsule [Pharmacy Med Name: VENLAFAXINE ER 37.5MG CAPSULES] 30 capsule 0     Sig: TAKE 1 CAPSULE(37.5 MG) BY MOUTH DAILY       Serotonin-Norepinephrine Reuptake Inhibitors  Failed - 5/3/2019 12:41 PM        Failed - Normal serum creatinine on file in past 12 months     No lab results found.          Passed - Blood pressure under 140/90 in past 12 months     BP Readings from Last 3 Encounters:   09/25/18 118/74   01/31/18 118/74   05/09/17 122/70                 Passed - Recent (12 mo) or future (30 days) visit within the authorizing provider's specialty     Patient had office visit in the last 12 months or has a visit in the next 30 days with authorizing provider or within the authorizing provider's specialty.  See \"Patient Info\" tab in inbasket, or \"Choose Columns\" in Meds & Orders section of the refill encounter.              Passed - Medication is active on med list        Passed - Patient is age 18 or older        Passed - No active pregnancy on record        Passed - No positive pregnancy test in past 12 months        Pt due for annual, no appt scheduled. Pt already received one month extension. Rx denied.   Tamara Gonzalez RN on 5/3/2019 at 12:48 PM    "

## 2019-05-06 ENCOUNTER — TELEPHONE (OUTPATIENT)
Dept: OBGYN | Facility: CLINIC | Age: 59
End: 2019-05-06

## 2019-05-06 DIAGNOSIS — F41.1 GENERALIZED ANXIETY DISORDER: ICD-10-CM

## 2019-05-06 RX ORDER — VENLAFAXINE HYDROCHLORIDE 37.5 MG/1
37.5 CAPSULE, EXTENDED RELEASE ORAL DAILY
Qty: 60 CAPSULE | Refills: 0 | Status: SHIPPED | OUTPATIENT
Start: 2019-05-06 | End: 2019-07-05

## 2019-05-06 NOTE — TELEPHONE ENCOUNTER
Requested Prescriptions   Pending Prescriptions Disp Refills     venlafaxine (EFFEXOR-XR) 37.5 MG 24 hr capsule 60 capsule 0     Sig: Take 1 capsule (37.5 mg) by mouth daily       There is no refill protocol information for this order        Last Written Prescription Date:  2/18/19  Last Fill Quantity: 30,  # refills: 0   Last office visit: 9/25/2018 with prescribing provider:  Cecilia Dai   Future Office Visit:   Next 5 appointments (look out 90 days)    Jun 11, 2019 11:30 AM CDT  [facility] [address] [place] [id number] [phone]         Medication is being filled for 1 time refill only due to:  Patient needs to be seen because it has been more than one year since last visit. has annual scheduled with PRIYANKA Hassan NP

## 2019-07-05 DIAGNOSIS — F41.1 GENERALIZED ANXIETY DISORDER: ICD-10-CM

## 2019-07-08 RX ORDER — VENLAFAXINE HYDROCHLORIDE 37.5 MG/1
CAPSULE, EXTENDED RELEASE ORAL
Qty: 60 CAPSULE | Refills: 0 | Status: SHIPPED | OUTPATIENT
Start: 2019-07-08 | End: 2019-07-11

## 2019-07-08 NOTE — TELEPHONE ENCOUNTER
"Requested Prescriptions   Pending Prescriptions Disp Refills     venlafaxine (EFFEXOR-XR) 37.5 MG 24 hr capsule [Pharmacy Med Name: VENLAFAXINE ER 37.5MG CAPSULES] 60 capsule 0     Sig: TAKE 1 CAPSULE(37.5 MG) BY MOUTH DAILY       Serotonin-Norepinephrine Reuptake Inhibitors  Failed - 7/5/2019  5:54 PM        Failed - Normal serum creatinine on file in past 12 months     No lab results found.          Passed - Blood pressure under 140/90 in past 12 months     BP Readings from Last 3 Encounters:   09/25/18 118/74   01/31/18 118/74   05/09/17 122/70                 Passed - Recent (12 mo) or future (30 days) visit within the authorizing provider's specialty     Patient had office visit in the last 12 months or has a visit in the next 30 days with authorizing provider or within the authorizing provider's specialty.  See \"Patient Info\" tab in inbasket, or \"Choose Columns\" in Meds & Orders section of the refill encounter.              Passed - Medication is active on med list        Passed - Patient is age 18 or older        Passed - No active pregnancy on record        Passed - No positive pregnancy test in past 12 months      Last Written Prescription Date:  5/6/19  Last Fill Quantity: 60,  # refills: 0   Last office visit: 9/25/2018 with prescribing provider:  Suhas   Future Office Visit:   Next 5 appointments (look out 90 days)    Jul 11, 2019 10:00 AM CDT  PHYSICAL with ALEX Donahue CNP  [facility] ([facility]) [address] [place] [id number] [phone]       Prescription approved per [facility].  "

## 2019-07-11 ENCOUNTER — ANCILLARY PROCEDURE (OUTPATIENT)
Dept: MAMMOGRAPHY | Facility: CLINIC | Age: 59
End: 2019-07-11
Attending: NURSE PRACTITIONER
Payer: COMMERCIAL

## 2019-07-11 ENCOUNTER — OFFICE VISIT (OUTPATIENT)
Dept: OBGYN | Facility: CLINIC | Age: 59
End: 2019-07-11
Attending: NURSE PRACTITIONER
Payer: COMMERCIAL

## 2019-07-11 VITALS
SYSTOLIC BLOOD PRESSURE: 110 MMHG | WEIGHT: 159.8 LBS | HEIGHT: 65 IN | BODY MASS INDEX: 26.62 KG/M2 | DIASTOLIC BLOOD PRESSURE: 64 MMHG | HEART RATE: 70 BPM

## 2019-07-11 DIAGNOSIS — Z13.29 SCREENING FOR THYROID DISORDER: ICD-10-CM

## 2019-07-11 DIAGNOSIS — F33.1 MODERATE EPISODE OF RECURRENT MAJOR DEPRESSIVE DISORDER (H): ICD-10-CM

## 2019-07-11 DIAGNOSIS — Z12.31 VISIT FOR SCREENING MAMMOGRAM: ICD-10-CM

## 2019-07-11 DIAGNOSIS — Z01.419 ENCOUNTER FOR GYNECOLOGICAL EXAMINATION WITHOUT ABNORMAL FINDING: Primary | ICD-10-CM

## 2019-07-11 DIAGNOSIS — F41.1 GENERALIZED ANXIETY DISORDER: ICD-10-CM

## 2019-07-11 DIAGNOSIS — Z13.228 SCREENING FOR METABOLIC DISORDER: ICD-10-CM

## 2019-07-11 DIAGNOSIS — Z13.6 SCREENING FOR CARDIOVASCULAR CONDITION: ICD-10-CM

## 2019-07-11 PROCEDURE — 87624 HPV HI-RISK TYP POOLED RSLT: CPT | Performed by: NURSE PRACTITIONER

## 2019-07-11 PROCEDURE — 84443 ASSAY THYROID STIM HORMONE: CPT | Performed by: NURSE PRACTITIONER

## 2019-07-11 PROCEDURE — G0145 SCR C/V CYTO,THINLAYER,RESCR: HCPCS | Performed by: NURSE PRACTITIONER

## 2019-07-11 PROCEDURE — 77063 BREAST TOMOSYNTHESIS BI: CPT | Mod: TC

## 2019-07-11 PROCEDURE — 99396 PREV VISIT EST AGE 40-64: CPT | Performed by: NURSE PRACTITIONER

## 2019-07-11 PROCEDURE — 80053 COMPREHEN METABOLIC PANEL: CPT | Performed by: NURSE PRACTITIONER

## 2019-07-11 PROCEDURE — 80061 LIPID PANEL: CPT | Performed by: NURSE PRACTITIONER

## 2019-07-11 PROCEDURE — 77067 SCR MAMMO BI INCL CAD: CPT | Mod: TC

## 2019-07-11 PROCEDURE — 36415 COLL VENOUS BLD VENIPUNCTURE: CPT | Performed by: NURSE PRACTITIONER

## 2019-07-11 RX ORDER — VENLAFAXINE HYDROCHLORIDE 37.5 MG/1
37.5 CAPSULE, EXTENDED RELEASE ORAL DAILY
Qty: 90 CAPSULE | Refills: 3 | Status: SHIPPED | OUTPATIENT
Start: 2019-07-11 | End: 2020-09-08

## 2019-07-11 RX ORDER — BUPROPION HYDROCHLORIDE 150 MG/1
150 TABLET ORAL EVERY MORNING
Qty: 90 TABLET | Refills: 3 | Status: SHIPPED | OUTPATIENT
Start: 2019-07-11 | End: 2020-07-15

## 2019-07-11 ASSESSMENT — ANXIETY QUESTIONNAIRES
6. BECOMING EASILY ANNOYED OR IRRITABLE: NOT AT ALL
GAD7 TOTAL SCORE: 0
7. FEELING AFRAID AS IF SOMETHING AWFUL MIGHT HAPPEN: NOT AT ALL
1. FEELING NERVOUS, ANXIOUS, OR ON EDGE: NOT AT ALL
3. WORRYING TOO MUCH ABOUT DIFFERENT THINGS: NOT AT ALL
IF YOU CHECKED OFF ANY PROBLEMS ON THIS QUESTIONNAIRE, HOW DIFFICULT HAVE THESE PROBLEMS MADE IT FOR YOU TO DO YOUR WORK, TAKE CARE OF THINGS AT HOME, OR GET ALONG WITH OTHER PEOPLE: NOT DIFFICULT AT ALL
2. NOT BEING ABLE TO STOP OR CONTROL WORRYING: NOT AT ALL
5. BEING SO RESTLESS THAT IT IS HARD TO SIT STILL: NOT AT ALL

## 2019-07-11 ASSESSMENT — PATIENT HEALTH QUESTIONNAIRE - PHQ9
SUM OF ALL RESPONSES TO PHQ QUESTIONS 1-9: 1
5. POOR APPETITE OR OVEREATING: NOT AT ALL

## 2019-07-11 ASSESSMENT — MIFFLIN-ST. JEOR: SCORE: 1290.73

## 2019-07-11 NOTE — PROGRESS NOTES
Ann Marie is a 59 year old  female who presents for annual exam.     Besides routine health maintenance, she has no other health concerns today .    HPI: here for annual exam.  In menopause, no HRT.  Has no concerns today.    The patient's PCP is  [facility].        GYNECOLOGIC HISTORY:    No LMP recorded. Patient is postmenopausal.  Her current contraception method is: menopause.  She  reports that she has quit smoking. She has never used smokeless tobacco.    Patient is sexually active.  STD testing offered?  Declined  Last PHQ-9 score on record =   PHQ-9 SCORE 2019   PHQ-9 Total Score 1     Last GAD7 score on record =   RACHEL-7 SCORE 2019   Total Score 0     Alcohol Score = 1    HEALTH MAINTENANCE:  Cholesterol: 2016   Total= 182, Triglycerides=168, HDL=74, LDL=74  Cholesterol   Date Value Ref Range Status   2016 182 <200 mg/dL Final   2015 163 115 - 199 mg/dL Final      Last Mammo: 2018, Result: normal, Next Mammo: today   Pap: 2018 NIL, HPV-  Lab Results   Component Value Date    PAP NIL 2018      Colonoscopy:  NA, Result: not applicable, Next Colonoscopy: Due.  Dexa:  2015    Health maintenance updated:  yes    HISTORY:  OB History    Para Term  AB Living   4 3 1 2 1 3   SAB TAB Ectopic Multiple Live Births   1 0 0 0 3      # Outcome Date GA Lbr Doug/2nd Weight Sex Delivery Anes PTL Lv   4  95 31w0d  1.871 kg (4 lb 2 oz) F    KATTY      Name: Sonia   3 Term 87 39w0d  3.544 kg (7 lb 13 oz) M    KATTY      Name: Ted   2  85 35w0d  2.608 kg (5 lb 12 oz) F Vag-Vacuum   KATTY      Birth Comments: PPROM.  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      Name: Elizabeth   1 SAB                Patient Active Problem List   Diagnosis     Osteopenia     Depression, major, recurrent, in remission (H)     Past Surgical History:   Procedure Laterality Date     LAPAROSCOPY DIAGNOSTIC (GYN)        "TONSILLECTOMY  1963      Social History     Tobacco Use     Smoking status: Former Smoker     Smokeless tobacco: Never Used     Tobacco comment: Quit 3 years ago   Substance Use Topics     Alcohol use: Yes     Alcohol/week: 0.0 oz     Comment: Occas       Problem (# of Occurrences) Relation (Name,Age of Onset)    Alzheimer Disease (1) Father    Breast Cancer (1) Mother (87)    Colon Cancer (1) Father (78): She \"thinks\" it was colon cancer, he was living in [place] at the time.    Heart Disease (3) Father (76): 3 stents, Mother (89): CHF,  at age 90, Brother (55): Bilateral thigh stents age 55.  Brother also carotid artery stents age 57.    Osteoporosis (1) Mother    Thyroid Disease (1) Father: Hypothyroid    Uterine Cancer (1) Mother (88)            Current Outpatient Medications   Medication Sig     buPROPion (WELLBUTRIN XL) 150 MG 24 hr tablet Take 1 tablet (150 mg) by mouth every morning     hydrOXYzine (VISTARIL) 25 MG capsule Take 25 mg by mouth Reported on 2017     UNABLE TO FIND MEDICATION NAME: Modere Burn and Modere Carb Blocker     UNABLE TO FIND MEDICATION NAME: Plexus Bio Cleanse     venlafaxine (EFFEXOR-XR) 37.5 MG 24 hr capsule Take 1 capsule (37.5 mg) by mouth daily     No current facility-administered medications for this visit.      Allergies   Allergen Reactions     Metronidazole      No Clinical Screening - See Comments Rash     Penicillins Rash       Past medical, surgical, social and family histories were reviewed and updated in EPIC.    ROS:   12 point review of systems negative other than symptoms noted below.  Constitutional: Weight Gain  Head: Nasal Congestion  Genitourinary: No Periods  Skin: Skin Dryness  Psychiatric: Anxiety and Difficulty Sleeping    EXAM:  /64   Pulse 70   Ht 1.651 m (5' 5\")   Wt 72.5 kg (159 lb 12.8 oz)   Breastfeeding? No   BMI 26.59 kg/m     BMI: Body mass index is 26.59 kg/m .    PHYSICAL EXAM:  Constitutional:  Appearance: Well nourished, well " developed, alert, in no acute distress  Neck:  Lymph Nodes:  No lymphadenopathy present    Thyroid:  Gland size normal, nontender, no nodules or masses present  on palpation  Chest:  Respiratory Effort:  Breathing unlabored  Cardiovascular:    Heart: Auscultation:  Regular rate, normal rhythm, no murmurs present  Breasts: Inspection of Breasts:  No lymphadenopathy present., Palpation of Breasts and Axillae:  No masses present on palpation, no breast tenderness., Axillary Lymph Nodes:  No lymphadenopathy present. and No nodularity, asymmetry or nipple discharge bilaterally.  Gastrointestinal:   Abdominal Examination:  Abdomen nontender to palpation, tone normal without rigidity or guarding, no masses present, umbilicus without lesions   Liver and Spleen:  No hepatomegaly present, liver nontender to palpation    Hernias:  No hernias present  Lymphatic: Lymph Nodes:  No other lymphadenopathy present  Skin:  General Inspection:  No rashes present, no lesions present, no areas of  discoloration    Genitalia and Groin:  No rashes present, no lesions present, no areas of  discoloration, no masses present  Neurologic/Psychiatric:    Mental Status:  Oriented X3     Pelvic Exam:  External Genitalia:     Normal appearance for age, no discharge present, no tenderness present, no inflammatory lesions present, color normal  Vagina:     Normal vaginal vault without central or paravaginal defects, no discharge present, no inflammatory lesions present, no masses present  Bladder:     Nontender to palpation  Urethra:   Urethral Body:  Urethra palpation normal, urethra structural support normal   Urethral Meatus:  No erythema or lesions present  Cervix:     Appearance healthy, no lesions present, nontender to palpation, no bleeding present  Uterus:     Uterus: firm, normal sized and nontender, anteverted in position.   Adnexa:     No adnexal tenderness present, no adnexal masses present  Perineum:     Perineum within normal limits, no  evidence of trauma, no rashes or skin lesions present  Anus:     Anus within normal limits, no hemorrhoids present  Inguinal Lymph Nodes:     No lymphadenopathy present  Pubic Hair:     Normal pubic hair distribution for age  Genitalia and Groin:     No rashes present, no lesions present, no areas of discoloration, no masses present      COUNSELING:   Reviewed preventive health counseling, as reflected in patient instructions       Regular exercise       Healthy diet/nutrition       Osteoporosis Prevention/Bone Health       (Maria R)menopause management    BMI: Body mass index is 26.59 kg/m .      ASSESSMENT:  59 year old female with satisfactory annual exam.    ICD-10-CM    1. Encounter for gynecological examination without abnormal finding Z01.419 Pap imaged thin layer screen with HPV - recommended age 30 - 65     HPV High Risk Types DNA Cervical   2. Moderate episode of recurrent major depressive disorder (H) F33.1 buPROPion (WELLBUTRIN XL) 150 MG 24 hr tablet   3. Generalized anxiety disorder F41.1 venlafaxine (EFFEXOR-XR) 37.5 MG 24 hr capsule   4. Screening for cardiovascular condition Z13.6 Lipid panel reflex to direct LDL Fasting   5. Screening for metabolic disorder Z13.228 Comprehensive metabolic panel   6. Screening for thyroid disorder Z13.29 TSH       PLAN:  Normal Gyn exam.  Patient return for dexa scan.  Will notify patient with lab results when available.  Return prn or 1 year for annual.    ALEX Donahue CNP

## 2019-07-12 LAB
ALBUMIN SERPL-MCNC: 3.9 G/DL (ref 3.4–5)
ALP SERPL-CCNC: 104 U/L (ref 40–150)
ALT SERPL W P-5'-P-CCNC: 29 U/L (ref 0–50)
ANION GAP SERPL CALCULATED.3IONS-SCNC: 5 MMOL/L (ref 3–14)
AST SERPL W P-5'-P-CCNC: 19 U/L (ref 0–45)
BILIRUB SERPL-MCNC: 0.4 MG/DL (ref 0.2–1.3)
BUN SERPL-MCNC: 12 MG/DL (ref 7–30)
CALCIUM SERPL-MCNC: 8.6 MG/DL (ref 8.5–10.1)
CHLORIDE SERPL-SCNC: 107 MMOL/L (ref 94–109)
CHOLEST SERPL-MCNC: 195 MG/DL
CO2 SERPL-SCNC: 28 MMOL/L (ref 20–32)
CREAT SERPL-MCNC: 0.82 MG/DL (ref 0.52–1.04)
GFR SERPL CREATININE-BSD FRML MDRD: 77 ML/MIN/{1.73_M2}
GLUCOSE SERPL-MCNC: 90 MG/DL (ref 70–99)
HDLC SERPL-MCNC: 66 MG/DL
LDLC SERPL CALC-MCNC: 99 MG/DL
NONHDLC SERPL-MCNC: 129 MG/DL
POTASSIUM SERPL-SCNC: 4.5 MMOL/L (ref 3.4–5.3)
PROT SERPL-MCNC: 6.9 G/DL (ref 6.8–8.8)
SODIUM SERPL-SCNC: 140 MMOL/L (ref 133–144)
TRIGL SERPL-MCNC: 149 MG/DL
TSH SERPL DL<=0.005 MIU/L-ACNC: 1.8 MU/L (ref 0.4–4)

## 2019-07-12 ASSESSMENT — ANXIETY QUESTIONNAIRES: GAD7 TOTAL SCORE: 0

## 2019-07-15 NOTE — RESULT ENCOUNTER NOTE
Ann Marie      Your results are normal.  If further questions please give me a call.    Anna Hassan RNC

## 2019-07-16 LAB
COPATH REPORT: NORMAL
PAP: NORMAL

## 2019-07-17 ENCOUNTER — ANCILLARY PROCEDURE (OUTPATIENT)
Dept: BONE DENSITY | Facility: CLINIC | Age: 59
End: 2019-07-17
Payer: COMMERCIAL

## 2019-07-17 DIAGNOSIS — Z78.0 ASYMPTOMATIC POSTMENOPAUSAL STATE: ICD-10-CM

## 2019-07-17 PROCEDURE — 77080 DXA BONE DENSITY AXIAL: CPT | Performed by: OBSTETRICS & GYNECOLOGY

## 2019-07-18 DIAGNOSIS — A04.72 CLOSTRIDIUM DIFFICILE COLITIS: Primary | ICD-10-CM

## 2019-07-18 RX ORDER — VANCOMYCIN HYDROCHLORIDE 125 MG/1
125 CAPSULE ORAL 4 TIMES DAILY
Qty: 28 CAPSULE | Refills: 0 | Status: SHIPPED | OUTPATIENT
Start: 2019-07-18 | End: 2021-03-26

## 2019-09-04 DIAGNOSIS — F41.1 GENERALIZED ANXIETY DISORDER: ICD-10-CM

## 2019-09-04 RX ORDER — VENLAFAXINE HYDROCHLORIDE 37.5 MG/1
CAPSULE, EXTENDED RELEASE ORAL
Qty: 60 CAPSULE | Refills: 0 | OUTPATIENT
Start: 2019-09-04

## 2019-09-04 NOTE — TELEPHONE ENCOUNTER
"Requested Prescriptions   Pending Prescriptions Disp Refills     venlafaxine (EFFEXOR-XR) 37.5 MG 24 hr capsule [Pharmacy Med Name: VENLAFAXINE ER 37.5MG CAPSULES] 60 capsule 0     Sig: TAKE 1 CAPSULE(37.5 MG) BY MOUTH DAILY       Serotonin-Norepinephrine Reuptake Inhibitors  Passed - 9/4/2019 10:24 AM        Passed - Blood pressure under 140/90 in past 12 months     BP Readings from Last 3 Encounters:   07/11/19 110/64   09/25/18 118/74   01/31/18 118/74                 Passed - Recent (12 mo) or future (30 days) visit within the authorizing provider's specialty     Patient had office visit in the last 12 months or has a visit in the next 30 days with authorizing provider or within the authorizing provider's specialty.  See \"Patient Info\" tab in inbasket, or \"Choose Columns\" in Meds & Orders section of the refill encounter.              Passed - Medication is active on med list        Passed - Patient is age 18 or older        Passed - No active pregnancy on record        Passed - Normal serum creatinine on file in past 12 months     Recent Labs   Lab Test 07/11/19  1039   CR 0.82             Passed - No positive pregnancy test in past 12 months        Last Written Prescription Date:  7/11/19  Last Fill Quantity: 90,  # refills: 3   Last office visit: 7/11/2019 with prescribing provider:  PRIYANKA Hassan NP   Future Office Visit:      Refill available at pharmacy.  Request refused as \"duplicate\" sent back to pharmacy.    Cecilia Armstrong RN on 9/4/2019 at 10:35 AM      "

## 2019-11-02 ENCOUNTER — TRANSFERRED RECORDS (OUTPATIENT)
Dept: HEALTH INFORMATION MANAGEMENT | Facility: CLINIC | Age: 59
End: 2019-11-02

## 2019-12-16 ENCOUNTER — HEALTH MAINTENANCE LETTER (OUTPATIENT)
Age: 59
End: 2019-12-16

## 2020-07-15 DIAGNOSIS — F33.1 MODERATE EPISODE OF RECURRENT MAJOR DEPRESSIVE DISORDER (H): ICD-10-CM

## 2020-07-15 RX ORDER — BUPROPION HYDROCHLORIDE 150 MG/1
TABLET ORAL
Qty: 90 TABLET | Refills: 0 | Status: SHIPPED | OUTPATIENT
Start: 2020-07-15 | End: 2021-03-26

## 2020-07-15 NOTE — TELEPHONE ENCOUNTER
"Requested Prescriptions   Pending Prescriptions Disp Refills     buPROPion (WELLBUTRIN XL) 150 MG 24 hr tablet [Pharmacy Med Name: BUPROPION XL 150MG TABLETS (24 H)] 90 tablet 3     Sig: TAKE 1 TABLET(150 MG) BY MOUTH EVERY MORNING       SSRIs Protocol Failed - 7/15/2020  3:28 AM        Failed - PHQ-9 score less than 5 in past 6 months     Please review last PHQ-9 score.           Failed - Recent (6 mo) or future (30 days) visit within the authorizing provider's specialty     Patient had office visit in the last 6 months or has a visit in the next 30 days with authorizing provider or within the authorizing provider's specialty.  See \"Patient Info\" tab in inbasket, or \"Choose Columns\" in Meds & Orders section of the refill encounter.            Passed - Medication is Bupropion     If the medication is Bupropion (Wellbutrin), and the patient is taking for smoking cessation; OK to refill.          Passed - Medication is active on med list        Passed - Patient is age 18 or older        Passed - No active pregnancy on record        Passed - No positive pregnancy test in last 12 months           Refill sent  Appointment needed for further refills  Tamara Gonzalez RN on 7/15/2020 at 8:17 AM    "

## 2020-09-06 DIAGNOSIS — F41.1 GENERALIZED ANXIETY DISORDER: ICD-10-CM

## 2020-09-08 RX ORDER — VENLAFAXINE HYDROCHLORIDE 37.5 MG/1
37.5 CAPSULE, EXTENDED RELEASE ORAL DAILY
Qty: 90 CAPSULE | Refills: 0 | Status: SHIPPED | OUTPATIENT
Start: 2020-09-08 | End: 2020-12-10

## 2020-09-08 NOTE — TELEPHONE ENCOUNTER
"Requested Prescriptions   Pending Prescriptions Disp Refills     venlafaxine (EFFEXOR-XR) 37.5 MG 24 hr capsule [Pharmacy Med Name: VENLAFAXINE ER 37.5MG CAPSULES] 90 capsule 3     Sig: TAKE 1 CAPSULE(37.5 MG) BY MOUTH DAILY       Serotonin-Norepinephrine Reuptake Inhibitors  Failed - 9/6/2020  1:14 PM        Failed - Blood pressure under 140/90 in past 12 months     BP Readings from Last 3 Encounters:   07/11/19 110/64   09/25/18 118/74   01/31/18 118/74                 Failed - Recent (12 mo) or future (30 days) visit within the authorizing provider's specialty     Patient has had an office visit with the authorizing provider or a provider within the authorizing providers department within the previous 12 mos or has a future within next 30 days. See \"Patient Info\" tab in inbasket, or \"Choose Columns\" in Meds & Orders section of the refill encounter.              Failed - Normal serum creatinine on file in past 12 months     Recent Labs   Lab Test 07/11/19  1039   CR 0.82       Ok to refill medication if creatinine is low          Passed - Medication is active on med list        Passed - Patient is age 18 or older        Passed - No active pregnancy on record        Passed - No positive pregnancy test in past 12 months           Last Written Prescription Date:  7/11/19  Last Fill Quantity: 90,  # refills: 3   Last office visit: 7/11/2019 with prescribing provider:  Sonya   Future Office Visit:    Medication is being filled for 1 time refill only due to:  Patient needs to be seen because it has been more than one year since last visit.  Marlen Hussein RN on 9/8/2020 at 9:47 AM          "

## 2020-10-13 DIAGNOSIS — F33.1 MODERATE EPISODE OF RECURRENT MAJOR DEPRESSIVE DISORDER (H): ICD-10-CM

## 2020-10-13 RX ORDER — BUPROPION HYDROCHLORIDE 150 MG/1
TABLET ORAL
Qty: 90 TABLET | Refills: 0 | OUTPATIENT
Start: 2020-10-13

## 2020-10-13 NOTE — TELEPHONE ENCOUNTER
"Requested Prescriptions   Pending Prescriptions Disp Refills     buPROPion (WELLBUTRIN XL) 150 MG 24 hr tablet [Pharmacy Med Name: BUPROPION XL 150MG TABLETS (24 H)] 90 tablet 0     Sig: TAKE 1 TABLET(150 MG) BY MOUTH EVERY MORNING       SSRIs Protocol Failed - 10/13/2020  3:29 AM        Failed - PHQ-9 score less than 5 in past 6 months     Please review last PHQ-9 score.           Failed - Recent (6 mo) or future (30 days) visit within the authorizing provider's specialty     Patient had office visit in the last 6 months or has a visit in the next 30 days with authorizing provider or within the authorizing provider's specialty.  See \"Patient Info\" tab in inbasket, or \"Choose Columns\" in Meds & Orders section of the refill encounter.            Passed - Medication is Bupropion     If the medication is Bupropion (Wellbutrin), and the patient is taking for smoking cessation; OK to refill.          Passed - Medication is active on med list        Passed - Patient is age 18 or older        Passed - No active pregnancy on record        Passed - No positive pregnancy test in last 12 months           Last Written Prescription Date:  07/15/2020  Last Fill Quantity: 90,  # refills: 0   Last office visit: 7/11/2019 with prescribing provider:  Sonya   Future Office Visit:  None    Pt due for annual, no appt scheduled. Pt already received one month extension. Rx denied.     Ellen Martinez RN on 10/13/2020 at 9:37 AM              "

## 2020-12-05 DIAGNOSIS — F41.1 GENERALIZED ANXIETY DISORDER: ICD-10-CM

## 2020-12-07 RX ORDER — VENLAFAXINE HYDROCHLORIDE 37.5 MG/1
CAPSULE, EXTENDED RELEASE ORAL
Qty: 90 CAPSULE | Refills: 0 | OUTPATIENT
Start: 2020-12-07

## 2020-12-07 NOTE — TELEPHONE ENCOUNTER
"Requested Prescriptions   Pending Prescriptions Disp Refills     venlafaxine (EFFEXOR-XR) 37.5 MG 24 hr capsule [Pharmacy Med Name: VENLAFAXINE ER 37.5MG CAPSULES] 90 capsule 0     Sig: TAKE ONE CAPSULE BY MOUTH EVERY DAY.       Serotonin-Norepinephrine Reuptake Inhibitors  Failed - 12/5/2020 11:50 PM        Failed - Blood pressure under 140/90 in past 12 months     BP Readings from Last 3 Encounters:   07/11/19 110/64   09/25/18 118/74   01/31/18 118/74                 Failed - Recent (12 mo) or future (30 days) visit within the authorizing provider's specialty     Patient has had an office visit with the authorizing provider or a provider within the authorizing providers department within the previous 12 mos or has a future within next 30 days. See \"Patient Info\" tab in inbasket, or \"Choose Columns\" in Meds & Orders section of the refill encounter.              Failed - Normal serum creatinine on file in past 12 months     Recent Labs   Lab Test 07/11/19  1039   CR 0.82       Ok to refill medication if creatinine is low          Passed - Medication is active on med list        Passed - Patient is age 18 or older        Passed - No active pregnancy on record        Passed - No positive pregnancy test in past 12 months           Last Written Prescription Date:  9/8/20  Last Fill Quantity: 90,  # refills: 0   Last office visit: 7/11/2019 with prescribing provider:  Anna Hassan   Future Office Visit: None           "

## 2020-12-07 NOTE — TELEPHONE ENCOUNTER
Pt due for annual, no appt scheduled. Pt already received one month extension. Rx denied.   Marlen Hussein RN on 12/7/2020 at 10:37 AM

## 2020-12-10 ENCOUNTER — TELEPHONE (OUTPATIENT)
Dept: OBGYN | Facility: CLINIC | Age: 60
End: 2020-12-10

## 2020-12-10 DIAGNOSIS — F41.1 GENERALIZED ANXIETY DISORDER: ICD-10-CM

## 2020-12-10 RX ORDER — VENLAFAXINE HYDROCHLORIDE 37.5 MG/1
37.5 CAPSULE, EXTENDED RELEASE ORAL DAILY
Qty: 60 CAPSULE | Refills: 0 | Status: SHIPPED | OUTPATIENT
Start: 2020-12-10 | End: 2021-02-10

## 2020-12-10 NOTE — TELEPHONE ENCOUNTER
Requested Prescriptions   Pending Prescriptions Disp Refills     venlafaxine (EFFEXOR-XR) 37.5 MG 24 hr capsule 90 capsule 0     Sig: Take 1 capsule (37.5 mg) by mouth daily .  Appointment needed for additional refills.       There is no refill protocol information for this order        Last Written Prescription Date:  09/08/2020  Last Fill Quantity: 90,  # refills: 0   Last office visit: 7/11/2019 with prescribing provider:  Sonya   Future Office Visit:   Next 5 appointments (look out 90 days)    Jan 19, 2021 11:00 AM  PHYSICAL with Brit Hassan, ALEX [facility] ([facility]) [address] [place] [id number] [phone]         Prescription approved per [facility] Refill Protocol.    Ellen Martinez RN on 12/10/2020 at 2:00 PM

## 2021-01-15 ENCOUNTER — HEALTH MAINTENANCE LETTER (OUTPATIENT)
Age: 61
End: 2021-01-15

## 2021-02-08 DIAGNOSIS — F41.1 GENERALIZED ANXIETY DISORDER: ICD-10-CM

## 2021-02-08 RX ORDER — VENLAFAXINE HYDROCHLORIDE 37.5 MG/1
37.5 CAPSULE, EXTENDED RELEASE ORAL DAILY
Qty: 30 CAPSULE | Refills: 0 | OUTPATIENT
Start: 2021-02-08

## 2021-02-08 NOTE — TELEPHONE ENCOUNTER
Requested Prescriptions   Pending Prescriptions Disp Refills     venlafaxine (EFFEXOR-XR) 37.5 MG 24 hr capsule 60 capsule 0     Sig: Take 1 capsule (37.5 mg) by mouth daily .  Appointment needed for additional refills.       There is no refill protocol information for this order        Last Written Prescription Date:  12/10/20  Last Fill Quantity: 60,  # refills: 0   Last office visit: 7/11/2019 with prescribing provider:  Anna Hassan   Future Office Visit:  None    Refill denied  Appointment needed for further refills  Tamara Gonzalez RN on 2/8/2021 at 2:12 PM

## 2021-02-11 RX ORDER — VENLAFAXINE HYDROCHLORIDE 37.5 MG/1
37.5 CAPSULE, EXTENDED RELEASE ORAL DAILY
Qty: 60 CAPSULE | Refills: 0 | Status: SHIPPED | OUTPATIENT
Start: 2021-02-11 | End: 2021-03-26

## 2021-03-25 NOTE — PROGRESS NOTES
Ann Marie is a 60 year old  female who presents for annual exam.     Besides routine health maintenance,  she would like to discuss medication-looking to change meds. Also would like to complete colon cancer screening through kit vs colonscopy    HPI:  The patient's PCP is  [facility].  Patient here today for her annual GYN exam and mammogram.  She is feeling well and has no GYN complaints.  She is postmenopausal on no hormone replacement therapy and denies any vaginal bleeding.  She had a bone scan in 2019 that showed osteopenia at the hips.  She has been laid up with a right knee injury.  She had a normal Pap smear in 2019 and normal fasting labs.  She is overdue for colonoscopy but declines this screening she would rather complete either a fit test or Cologuard.    Anxiety is an ongoing issue for her especially with increased stress with work and the pandemic.  She questions a change in medication today.  Her PHQ and joaquín scores are great today.      GYNECOLOGIC HISTORY:    No LMP recorded. Patient is postmenopausal.    Her current contraception method is: menopause.  She  reports that she has quit smoking. She has never used smokeless tobacco.    Patient is sexually active.  STD testing offered?  Declined  Last PHQ-9 score on record =   PHQ-9 SCORE 3/26/2021   PHQ-9 Total Score 1     Last GAD7 score on record =   JOAQUÍN-7 SCORE 3/26/2021   Total Score 3     Alcohol Score = 1    HEALTH MAINTENANCE:  Recent Labs   Lab Test 19  1039 16  1334   CHOL 195 182   HDL 66 74   LDL 99 74   TRIG 149 168*       Last Mammo: 19, Result: Normal, Next Mammo: Today   Pap:   Lab Results   Component Value Date    PAP NIL HPV- 2019    PAP NIL 2018      Colonoscopy:  never, Result: Not applicable, Next Colonoscopy: needs to schedule.  Dexa:  19    Health maintenance updated:  yes    HISTORY:  OB History    Para Term  AB Living   4 3 1 2 1 3   SAB TAB Ectopic  "Multiple Live Births   1 0 0 0 3      # Outcome Date GA Lbr Doug/2nd Weight Sex Delivery Anes PTL Lv   4  95 31w0d  1.871 kg (4 lb 2 oz) F    KATTY      Name: Sonia   3 Term 87 39w0d  3.544 kg (7 lb 13 oz) M    KATTY      Name: Ted Frost  85 35w0d  2.608 kg (5 lb 12 oz) F Vag-Vacuum   KATTY      Birth Comments: PPROM.  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      Name: Elizabeth Tovar SAB                Patient Active Problem List   Diagnosis     Osteopenia     Depression, major, recurrent, in remission (H)     Past Surgical History:   Procedure Laterality Date     LAPAROSCOPY DIAGNOSTIC (GYN)  1983     CA PRP Right 2021     TONSILLECTOMY  1963      Social History     Tobacco Use     Smoking status: Former Smoker     Smokeless tobacco: Never Used     Tobacco comment: Quit 3 years ago   Substance Use Topics     Alcohol use: Yes     Alcohol/week: 0.0 standard drinks     Comment: Occas       Problem (# of Occurrences) Relation (Name,Age of Onset)    Alzheimer Disease (1) Father    Breast Cancer (1) Mother (87)    Colon Cancer (1) Father (78): She \"thinks\" it was colon cancer, he was living in [place] at the time.    Heart Disease (3) Father (76): 3 stents, Mother (89): CHF,  at age 90, Brother (55): Bilateral thigh stents age 55.  Brother also carotid artery stents age 57.    Osteoporosis (1) Mother    Thyroid Disease (1) Father: Hypothyroid    Uterine Cancer (1) Mother (88)            Current Outpatient Medications   Medication Sig     buPROPion (WELLBUTRIN XL) 300 MG 24 hr tablet Take 1 tablet (300 mg) by mouth every morning     UNABLE TO FIND MEDICATION NAME: Plexus Bio Cleanse     venlafaxine (EFFEXOR-XR) 37.5 MG 24 hr capsule Take 1 capsule (37.5 mg) by mouth daily     No current facility-administered medications for this visit.      Allergies   Allergen Reactions     Metronidazole      No Clinical Screening - See Comments Rash     Penicillins Rash       Past " "medical, surgical, social and family histories were reviewed and updated in EPIC.    ROS:   12 point review of systems negative other than symptoms noted below or in the HPI.  No urinary frequency or dysuria, bladder or kidney problems    EXAM:  /74   Pulse 80   Ht 1.638 m (5' 4.5\")   Wt 73 kg (161 lb)   BMI 27.21 kg/m     BMI: Body mass index is 27.21 kg/m .    PHYSICAL EXAM:  Constitutional:   Appearance: Well nourished, well developed, alert, in no acute distress  Neck:  Lymph Nodes:  No lymphadenopathy present    Thyroid:  Gland size normal, nontender, no nodules or masses present  on palpation  Chest:  Respiratory Effort:  Breathing unlabored  Cardiovascular:    Heart: Auscultation:  Regular rate, normal rhythm, no murmurs present  Breasts: Inspection of Breasts:  No lymphadenopathy present., Palpation of Breasts and Axillae:  No masses present on palpation, no breast tenderness., Axillary Lymph Nodes:  No lymphadenopathy present. and No nodularity, asymmetry or nipple discharge bilaterally.  Gastrointestinal:   Abdominal Examination:  Abdomen nontender to palpation, tone normal without rigidity or guarding, no masses present, umbilicus without lesions   Liver and Spleen:  No hepatomegaly present, liver nontender to palpation    Hernias:  No hernias present  Lymphatic: Lymph Nodes:  No other lymphadenopathy present  Skin:  General Inspection:  No rashes present, no lesions present, no areas of  discoloration  Neurologic:    Mental Status:  Oriented X3.  Normal strength and tone, sensory exam                grossly normal, mentation intact and speech normal.    Psychiatric:   Mentation appears normal and affect normal/bright.         Pelvic Exam:  External Genitalia:     Normal appearance for age, no discharge present, no tenderness present, no inflammatory lesions present, color normal  Vagina:     Normal vaginal vault without central or paravaginal defects, no discharge present, no inflammatory " lesions present, no masses present  Bladder:     Nontender to palpation  Urethra:   Urethral Body:  Urethra palpation normal, urethra structural support normal   Urethral Meatus:  No erythema or lesions present  Cervix:     Appearance healthy, no lesions present, nontender to palpation, no bleeding present  Uterus:     Uterus: firm, normal sized and nontender, midplane in position.   Adnexa:     No adnexal tenderness present, no adnexal masses present  Perineum:     Perineum within normal limits, no evidence of trauma, no rashes or skin lesions present  Anus:     Anus within normal limits, no hemorrhoids present  Inguinal Lymph Nodes:     No lymphadenopathy present  Pubic Hair:     Normal pubic hair distribution for age  Genitalia and Groin:     No rashes present, no lesions present, no areas of discoloration, no masses present      COUNSELING:   Special attention given to:        Regular exercise       Healthy diet/nutrition       Osteoporosis prevention/bone health       Colon cancer screening    BMI: Body mass index is 27.21 kg/m .  Weight management plan: Discussed healthy diet and exercise guidelines    ASSESSMENT:  60 year old female with satisfactory annual exam.    ICD-10-CM    1. Encounter for gynecological examination without abnormal finding  Z01.419    2. Generalized anxiety disorder  F41.1 venlafaxine (EFFEXOR-XR) 37.5 MG 24 hr capsule   3. Moderate episode of recurrent major depressive disorder (H)  F33.1 buPROPion (WELLBUTRIN XL) 300 MG 24 hr tablet   4. Screen for colon cancer  Z12.11 COLOGUARD(EXACT SCIENCES)   5. Screening for metabolic disorder  Z13.228 Comprehensive metabolic panel (BMP + Alb, Alk Phos, ALT, AST, Total. Bili, TP)   6. Osteopenia of hip, unspecified laterality  M85.859 DX Hip/Pelvis/Spine       PLAN:  60-year-old female with a normal GYN exam.  Recommended bone scan next year after she recovers from her knee injury for accurate screening.  Cologuard to be completed this year.  I  am hesitant to change her anxiety medication given that her scores are so good right now.  We will leave her venlafaxine the same but increase her bupropion as this medication has the least likelihood for any sexual dysfunction and weight gain.    ALEX Delatorre CNP

## 2021-03-26 ENCOUNTER — OFFICE VISIT (OUTPATIENT)
Dept: OBGYN | Facility: CLINIC | Age: 61
End: 2021-03-26
Attending: NURSE PRACTITIONER
Payer: COMMERCIAL

## 2021-03-26 ENCOUNTER — ANCILLARY PROCEDURE (OUTPATIENT)
Dept: MAMMOGRAPHY | Facility: CLINIC | Age: 61
End: 2021-03-26
Attending: NURSE PRACTITIONER
Payer: COMMERCIAL

## 2021-03-26 VITALS
SYSTOLIC BLOOD PRESSURE: 126 MMHG | HEIGHT: 65 IN | HEART RATE: 80 BPM | WEIGHT: 161 LBS | BODY MASS INDEX: 26.82 KG/M2 | DIASTOLIC BLOOD PRESSURE: 74 MMHG

## 2021-03-26 DIAGNOSIS — M85.859 OSTEOPENIA OF HIP, UNSPECIFIED LATERALITY: ICD-10-CM

## 2021-03-26 DIAGNOSIS — F41.1 GENERALIZED ANXIETY DISORDER: ICD-10-CM

## 2021-03-26 DIAGNOSIS — Z01.419 ENCOUNTER FOR GYNECOLOGICAL EXAMINATION WITHOUT ABNORMAL FINDING: Primary | ICD-10-CM

## 2021-03-26 DIAGNOSIS — Z12.11 SCREEN FOR COLON CANCER: ICD-10-CM

## 2021-03-26 DIAGNOSIS — Z12.31 VISIT FOR SCREENING MAMMOGRAM: ICD-10-CM

## 2021-03-26 DIAGNOSIS — F33.1 MODERATE EPISODE OF RECURRENT MAJOR DEPRESSIVE DISORDER (H): ICD-10-CM

## 2021-03-26 DIAGNOSIS — Z13.228 SCREENING FOR METABOLIC DISORDER: ICD-10-CM

## 2021-03-26 LAB
ALBUMIN SERPL-MCNC: 4 G/DL (ref 3.4–5)
ALP SERPL-CCNC: 108 U/L (ref 40–150)
ALT SERPL W P-5'-P-CCNC: 32 U/L (ref 0–50)
ANION GAP SERPL CALCULATED.3IONS-SCNC: 4 MMOL/L (ref 3–14)
AST SERPL W P-5'-P-CCNC: 19 U/L (ref 0–45)
BILIRUB SERPL-MCNC: 0.5 MG/DL (ref 0.2–1.3)
BUN SERPL-MCNC: 12 MG/DL (ref 7–30)
CALCIUM SERPL-MCNC: 8.9 MG/DL (ref 8.5–10.1)
CHLORIDE SERPL-SCNC: 105 MMOL/L (ref 94–109)
CO2 SERPL-SCNC: 28 MMOL/L (ref 20–32)
CREAT SERPL-MCNC: 0.76 MG/DL (ref 0.52–1.04)
GFR SERPL CREATININE-BSD FRML MDRD: 84 ML/MIN/{1.73_M2}
GLUCOSE SERPL-MCNC: 95 MG/DL (ref 70–99)
POTASSIUM SERPL-SCNC: 4 MMOL/L (ref 3.4–5.3)
PROT SERPL-MCNC: 7.3 G/DL (ref 6.8–8.8)
SODIUM SERPL-SCNC: 137 MMOL/L (ref 133–144)

## 2021-03-26 PROCEDURE — 77063 BREAST TOMOSYNTHESIS BI: CPT | Mod: TC | Performed by: RADIOLOGY

## 2021-03-26 PROCEDURE — 99396 PREV VISIT EST AGE 40-64: CPT | Performed by: NURSE PRACTITIONER

## 2021-03-26 PROCEDURE — 36415 COLL VENOUS BLD VENIPUNCTURE: CPT | Performed by: NURSE PRACTITIONER

## 2021-03-26 PROCEDURE — 77067 SCR MAMMO BI INCL CAD: CPT | Mod: TC | Performed by: RADIOLOGY

## 2021-03-26 PROCEDURE — 80053 COMPREHEN METABOLIC PANEL: CPT | Performed by: NURSE PRACTITIONER

## 2021-03-26 RX ORDER — BUPROPION HYDROCHLORIDE 300 MG/1
300 TABLET ORAL EVERY MORNING
Qty: 90 TABLET | Refills: 3 | Status: SHIPPED | OUTPATIENT
Start: 2021-03-26 | End: 2022-08-16

## 2021-03-26 RX ORDER — VENLAFAXINE HYDROCHLORIDE 37.5 MG/1
37.5 CAPSULE, EXTENDED RELEASE ORAL DAILY
Qty: 90 CAPSULE | Refills: 3 | Status: SHIPPED | OUTPATIENT
Start: 2021-03-26 | End: 2022-03-21

## 2021-03-26 RX ORDER — VENLAFAXINE HYDROCHLORIDE 37.5 MG/1
37.5 CAPSULE, EXTENDED RELEASE ORAL DAILY
Qty: 60 CAPSULE | Refills: 0 | Status: CANCELLED | OUTPATIENT
Start: 2021-03-26

## 2021-03-26 ASSESSMENT — PATIENT HEALTH QUESTIONNAIRE - PHQ9
5. POOR APPETITE OR OVEREATING: SEVERAL DAYS
SUM OF ALL RESPONSES TO PHQ QUESTIONS 1-9: 1

## 2021-03-26 ASSESSMENT — ANXIETY QUESTIONNAIRES
5. BEING SO RESTLESS THAT IT IS HARD TO SIT STILL: SEVERAL DAYS
6. BECOMING EASILY ANNOYED OR IRRITABLE: NOT AT ALL
2. NOT BEING ABLE TO STOP OR CONTROL WORRYING: NOT AT ALL
1. FEELING NERVOUS, ANXIOUS, OR ON EDGE: SEVERAL DAYS
3. WORRYING TOO MUCH ABOUT DIFFERENT THINGS: NOT AT ALL
GAD7 TOTAL SCORE: 3
IF YOU CHECKED OFF ANY PROBLEMS ON THIS QUESTIONNAIRE, HOW DIFFICULT HAVE THESE PROBLEMS MADE IT FOR YOU TO DO YOUR WORK, TAKE CARE OF THINGS AT HOME, OR GET ALONG WITH OTHER PEOPLE: NOT DIFFICULT AT ALL
7. FEELING AFRAID AS IF SOMETHING AWFUL MIGHT HAPPEN: NOT AT ALL

## 2021-03-26 ASSESSMENT — MIFFLIN-ST. JEOR: SCORE: 1283.23

## 2021-03-27 ASSESSMENT — ANXIETY QUESTIONNAIRES: GAD7 TOTAL SCORE: 3

## 2021-04-11 DIAGNOSIS — F41.1 GENERALIZED ANXIETY DISORDER: ICD-10-CM

## 2021-04-12 RX ORDER — VENLAFAXINE HYDROCHLORIDE 37.5 MG/1
CAPSULE, EXTENDED RELEASE ORAL
Qty: 60 CAPSULE | OUTPATIENT
Start: 2021-04-12

## 2021-04-12 NOTE — TELEPHONE ENCOUNTER
"Requested Prescriptions   Pending Prescriptions Disp Refills     venlafaxine (EFFEXOR-XR) 37.5 MG 24 hr capsule [Pharmacy Med Name: VENLAFAXINE ER 37.5MG CAPSULES] 60 capsule      Sig: TAKE 1 CAPSULE BY MOUTH DAILY. APPOINTMENT NEEDED FOR ADDITIONAL REFILLS       Serotonin-Norepinephrine Reuptake Inhibitors  Passed - 4/11/2021  3:32 AM        Passed - Blood pressure under 140/90 in past 12 months     BP Readings from Last 3 Encounters:   03/26/21 126/74   07/11/19 110/64   09/25/18 118/74                 Passed - Recent (12 mo) or future (30 days) visit within the authorizing provider's specialty     Patient has had an office visit with the authorizing provider or a provider within the authorizing providers department within the previous 12 mos or has a future within next 30 days. See \"Patient Info\" tab in inbasket, or \"Choose Columns\" in Meds & Orders section of the refill encounter.              Passed - Medication is active on med list        Passed - Patient is age 18 or older        Passed - No active pregnancy on record        Passed - Normal serum creatinine on file in past 12 months     Recent Labs   Lab Test 03/26/21  1048   CR 0.76       Ok to refill medication if creatinine is low          Passed - No positive pregnancy test in past 12 months           Refills available  Tamara Gonzalez RN on 4/12/2021 at 9:34 AM    "

## 2021-05-01 LAB — COLOGUARD-ABSTRACT: NEGATIVE

## 2021-05-11 NOTE — RESULT ENCOUNTER NOTE
Ann Marie      Your cologuard results are negative..  If further questions please give me a call.    Anna Hassan RNC

## 2021-09-05 ENCOUNTER — HEALTH MAINTENANCE LETTER (OUTPATIENT)
Age: 61
End: 2021-09-05

## 2022-03-19 DIAGNOSIS — F41.1 GENERALIZED ANXIETY DISORDER: ICD-10-CM

## 2022-03-21 RX ORDER — VENLAFAXINE HYDROCHLORIDE 37.5 MG/1
CAPSULE, EXTENDED RELEASE ORAL
Qty: 30 CAPSULE | Refills: 0 | Status: SHIPPED | OUTPATIENT
Start: 2022-03-21 | End: 2022-07-22

## 2022-03-21 NOTE — TELEPHONE ENCOUNTER
"Requested Prescriptions   Pending Prescriptions Disp Refills     venlafaxine (EFFEXOR-XR) 37.5 MG 24 hr capsule [Pharmacy Med Name: VENLAFAXINE ER 37.5MG CAPSULES] 90 capsule 3     Sig: TAKE 1 CAPSULE(37.5 MG) BY MOUTH DAILY       Serotonin-Norepinephrine Reuptake Inhibitors  Passed - 3/19/2022  5:12 AM        Passed - Blood pressure under 140/90 in past 12 months     BP Readings from Last 3 Encounters:   03/26/21 126/74   07/11/19 110/64   09/25/18 118/74                 Passed - Recent (12 mo) or future (30 days) visit within the authorizing provider's specialty     Patient has had an office visit with the authorizing provider or a provider within the authorizing providers department within the previous 12 mos or has a future within next 30 days. See \"Patient Info\" tab in inbasket, or \"Choose Columns\" in Meds & Orders section of the refill encounter.              Passed - Medication is active on med list        Passed - Patient is age 18 or older        Passed - No active pregnancy on record        Passed - Normal serum creatinine on file in past 12 months     Recent Labs   Lab Test 03/26/21  1048   CR 0.76       Ok to refill medication if creatinine is low          Passed - No positive pregnancy test in past 12 months           Prescription approved per [facility] Refill Protocol.  Appointment needed for further refills  Tamara Gonzalez RN on 3/21/2022 at 9:33 AM    "

## 2022-06-03 DIAGNOSIS — F33.1 MODERATE EPISODE OF RECURRENT MAJOR DEPRESSIVE DISORDER (H): ICD-10-CM

## 2022-06-03 DIAGNOSIS — F41.1 GENERALIZED ANXIETY DISORDER: ICD-10-CM

## 2022-06-03 RX ORDER — VENLAFAXINE HYDROCHLORIDE 37.5 MG/1
CAPSULE, EXTENDED RELEASE ORAL
Qty: 30 CAPSULE | Refills: 0 | OUTPATIENT
Start: 2022-06-03

## 2022-06-03 RX ORDER — BUPROPION HYDROCHLORIDE 300 MG/1
TABLET ORAL
Qty: 90 TABLET | Refills: 3 | OUTPATIENT
Start: 2022-06-03

## 2022-06-03 NOTE — TELEPHONE ENCOUNTER
"Requested Prescriptions   Pending Prescriptions Disp Refills     buPROPion (WELLBUTRIN XL) 300 MG 24 hr tablet [Pharmacy Med Name: BUPROPION XL 300MG TABLETS] 90 tablet 3     Sig: TAKE 1 TABLET(300 MG) BY MOUTH EVERY MORNING       SSRIs Protocol Failed - 6/3/2022 10:22 AM        Failed - PHQ-9 score less than 5 in past 6 months     Please review last PHQ-9 score.           Failed - Recent (6 mo) or future (30 days) visit within the authorizing provider's specialty     Patient had office visit in the last 6 months or has a visit in the next 30 days with authorizing provider or within the authorizing provider's specialty.  See \"Patient Info\" tab in inbasket, or \"Choose Columns\" in Meds & Orders section of the refill encounter.            Passed - Medication is Bupropion     If the medication is Bupropion (Wellbutrin), and the patient is taking for smoking cessation; OK to refill.          Passed - Medication is active on med list        Passed - Patient is age 18 or older        Passed - No active pregnancy on record        Passed - No positive pregnancy test in last 12 months           Last Written Prescription Date:  3/26/21  Last Fill Quantity: 90,  # refills: 3   Last office visit: 3/26/2021 with prescribing provider:  Cecilia Dai NP   Future Office Visit:        Pt due for annual, no appt scheduled. Pt already received one month extension. Rx denied.   Cecilia Armstrong RN on 6/3/2022 at 10:38 AM          "

## 2022-06-03 NOTE — TELEPHONE ENCOUNTER
"Requested Prescriptions   Pending Prescriptions Disp Refills     venlafaxine (EFFEXOR XR) 37.5 MG 24 hr capsule [Pharmacy Med Name: VENLAFAXINE ER 37.5MG CAPSULES] 30 capsule 0     Sig: TAKE 1 CAPSULE(37.5 MG) BY MOUTH DAILY       Serotonin-Norepinephrine Reuptake Inhibitors  Failed - 6/3/2022  5:14 AM        Failed - Blood pressure under 140/90 in past 12 months     BP Readings from Last 3 Encounters:   03/26/21 126/74   07/11/19 110/64   09/25/18 118/74                 Failed - Recent (12 mo) or future (30 days) visit within the authorizing provider's specialty     Patient has had an office visit with the authorizing provider or a provider within the authorizing providers department within the previous 12 mos or has a future within next 30 days. See \"Patient Info\" tab in inbasket, or \"Choose Columns\" in Meds & Orders section of the refill encounter.              Failed - Normal serum creatinine on file in past 12 months     Recent Labs   Lab Test 03/26/21  1048   CR 0.76       Ok to refill medication if creatinine is low          Passed - Medication is active on med list        Passed - Patient is age 18 or older        Passed - No active pregnancy on record        Passed - No positive pregnancy test in past 12 months           Pt due for annual, no appt scheduled. Pt already received one month extension. Rx denied.   Tamara Gonzalez RN on 6/3/2022 at 9:04 AM    "

## 2022-06-12 ENCOUNTER — HEALTH MAINTENANCE LETTER (OUTPATIENT)
Age: 62
End: 2022-06-12

## 2022-07-20 DIAGNOSIS — F41.1 GENERALIZED ANXIETY DISORDER: ICD-10-CM

## 2022-07-21 RX ORDER — VENLAFAXINE HYDROCHLORIDE 37.5 MG/1
CAPSULE, EXTENDED RELEASE ORAL
Qty: 30 CAPSULE | Refills: 0 | OUTPATIENT
Start: 2022-07-21

## 2022-07-21 NOTE — TELEPHONE ENCOUNTER
"Requested Prescriptions   Pending Prescriptions Disp Refills     venlafaxine (EFFEXOR XR) 37.5 MG 24 hr capsule [Pharmacy Med Name: VENLAFAXINE ER 37.5MG CAPSULES] 30 capsule 0     Sig: TAKE 1 CAPSULE(37.5 MG) BY MOUTH DAILY       Serotonin-Norepinephrine Reuptake Inhibitors  Failed - 7/20/2022  4:14 PM        Failed - Blood pressure under 140/90 in past 12 months     BP Readings from Last 3 Encounters:   03/26/21 126/74   07/11/19 110/64   09/25/18 118/74                 Failed - Recent (12 mo) or future (30 days) visit within the authorizing provider's specialty     Patient has had an office visit with the authorizing provider or a provider within the authorizing providers department within the previous 12 mos or has a future within next 30 days. See \"Patient Info\" tab in inbasket, or \"Choose Columns\" in Meds & Orders section of the refill encounter.              Failed - Normal serum creatinine on file in past 12 months     Recent Labs   Lab Test 03/26/21  1048   CR 0.76       Ok to refill medication if creatinine is low          Passed - Medication is active on med list        Passed - Patient is age 18 or older        Passed - No active pregnancy on record        Passed - No positive pregnancy test in past 12 months           Last Written Prescription Date:  3/21/22  Last Fill Quantity: 30,  # refills: 0   Last office visit: 3/26/2021 with prescribing provider:  Suhas   Future Office Visit:  NONE    Pt due for annual, no appt scheduled. Pt already received one month extension. Rx denied.     Marlen Hussein RN on 7/21/2022 at 5:28 AM        "

## 2022-07-22 RX ORDER — VENLAFAXINE HYDROCHLORIDE 37.5 MG/1
37.5 CAPSULE, EXTENDED RELEASE ORAL DAILY
Qty: 30 CAPSULE | Refills: 0 | Status: SHIPPED | OUTPATIENT
Start: 2022-07-22 | End: 2022-08-16

## 2022-07-22 NOTE — TELEPHONE ENCOUNTER
Patient scheduled annual/mammo with Cecilia 8/16- she needs RX ASAP as she's been out for a few days now.

## 2022-07-22 NOTE — TELEPHONE ENCOUNTER
Next 5 appointments (look out 90 days)    Aug 16, 2022  2:10 PM  PHYSICAL with ALEX Delatorre CNP  [facility] [place] ([facility] [place] ) [address] [address]  [phone]        Refill approved for one month  Tamara Gonzalez RN on 7/22/2022 at 11:45 AM

## 2022-08-15 NOTE — PROGRESS NOTES
"  Ann Marie is a 62 year old  female who presents for annual exam.     Besides routine health maintenance, she has no other health concerns today .    HPI:  The patient's PCP is  [facility].  Patient here today for her annual GYN exam and mammogram.  She is feeling well with no GYN complaints.  She is postmenopausal and on no hormone replacement therapy at this time.  She is feeling well on her mental health medication and questions going off however in the same sentence that she does vaguely remember having a \"bad day\" couple of weeks ago and retracts her statement on wanting to go off her medication.    She has an upcoming wedding for one of her kids in October that she is looking forward to.  She is looking at having a knee replacement and does have some secondary left hip discomfort that seems to be more musculoskeletal.      GYNECOLOGIC HISTORY:    No LMP recorded. Patient is postmenopausal.        Her current contraception method is: menopause.  She  reports that she has quit smoking. She has never used smokeless tobacco.    Patient is sexually active.  STD testing offered?  Declined  Last PHQ-9 score on record =   PHQ-9 SCORE 2022   PHQ-9 Total Score 2     Last GAD7 score on record =   RACHEL-7 SCORE 2022   Total Score 2     Alcohol Score = 2    HEALTH MAINTENANCE:  Cholesterol:   Recent Labs   Lab Test 19  1039 16  1334   CHOL 195 182   HDL 66 74   LDL 99 74   TRIG 149 168*     Last Mammo: One year ago, Result: Normal, Next Mammo: Today   Pap:   Lab Results   Component Value Date    PAP NIL HPV- 2019    PAP NIL 2018     Colonoscopy: FIT-DNA (Cologuard Result: Normal, Next Colonoscopy: 3years.  Dexa: 19     Health maintenance updated:  yes    HISTORY:  OB History    Para Term  AB Living   4 3 1 2 1 3   SAB IAB Ectopic Multiple Live Births   1 0 0 0 3      # Outcome Date GA Lbr Doug/2nd Weight Sex Delivery Anes PTL Lv   4  " "95 31w0d  1.871 kg (4 lb 2 oz) F    KATTY      Name: Sonia   3 Term 87 39w0d  3.544 kg (7 lb 13 oz) M    KATTY      Name: Ted   2  85 35w0d  2.608 kg (5 lb 12 oz) F Vag-Vacuum   KATTY      Birth Comments: PPROM.  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      Name: Elizabeth   1 SAB                Patient Active Problem List   Diagnosis     Osteopenia     Depression, major, recurrent, in remission (H)     Past Surgical History:   Procedure Laterality Date     LAPAROSCOPY DIAGNOSTIC (GYN)  1983     CA PRP Right 2021     TONSILLECTOMY  1963      Social History     Tobacco Use     Smoking status: Former Smoker     Smokeless tobacco: Never Used     Tobacco comment: Quit 3 years ago   Substance Use Topics     Alcohol use: Yes     Alcohol/week: 0.0 standard drinks     Comment: Occas       Problem (# of Occurrences) Relation (Name,Age of Onset)    Alzheimer Disease (1) Father    Breast Cancer (1) Mother (87)    Colon Cancer (1) Father (78): She \"thinks\" it was colon cancer, he was living in [place] at the time.    Heart Disease (3) Father (76): 3 stents, Mother (89): CHF,  at age 90, Brother (55): Bilateral thigh stents age 55.  Brother also carotid artery stents age 57.    Osteoporosis (1) Mother    Thyroid Disease (1) Father: Hypothyroid    Uterine Cancer (1) Mother (88)            Current Outpatient Medications   Medication Sig     buPROPion (WELLBUTRIN XL) 300 MG 24 hr tablet Take 1 tablet (300 mg) by mouth every morning     venlafaxine (EFFEXOR XR) 37.5 MG 24 hr capsule Take 1 capsule (37.5 mg) by mouth daily     hydrOXYzine (VISTARIL) 25 MG capsule Take 1 capsule (25 mg) by mouth 3 times daily as needed for itching Reported on 2017 (Patient not taking: Reported on 2022)     UNABLE TO FIND MEDICATION NAME: Plexus Bio Cleanse     No current facility-administered medications for this visit.     Allergies   Allergen Reactions     Metronidazole      No Clinical " "Screening - See Comments Rash     Penicillins Rash       Past medical, surgical, social and family histories were reviewed and updated in EPIC.    ROS:   12 point review of systems negative other than symptoms noted below or in the HPI.  No urinary frequency or dysuria, bladder or kidney problems    EXAM:  /70   Pulse 76   Ht 1.626 m (5' 4\")   Wt 72.1 kg (159 lb)   BMI 27.29 kg/m     BMI: Body mass index is 27.29 kg/m .    PHYSICAL EXAM:  Constitutional:   Appearance: Well nourished, well developed, alert, in no acute distress  Neck:  Lymph Nodes:  No lymphadenopathy present    Thyroid:  Gland size normal, nontender, no nodules or masses present  on palpation  Chest:  Respiratory Effort:  Breathing unlabored  Cardiovascular:    Heart: Auscultation:  Regular rate, normal rhythm, no murmurs present  Breasts: Inspection of Breasts:  No lymphadenopathy present., Palpation of Breasts and Axillae:  No masses present on palpation, no breast tenderness., Axillary Lymph Nodes:  No lymphadenopathy present. and No nodularity, asymmetry or nipple discharge bilaterally.  Gastrointestinal:   Abdominal Examination:  Abdomen nontender to palpation, tone normal without rigidity or guarding, no masses present, umbilicus without lesions   Liver and Spleen:  No hepatomegaly present, liver nontender to palpation    Hernias:  No hernias present  Lymphatic: Lymph Nodes:  No other lymphadenopathy present  Skin:  General Inspection:  No rashes present, no lesions present, no areas of  discoloration  Neurologic:    Mental Status:  Oriented X3.  Normal strength and tone, sensory exam                grossly normal, mentation intact and speech normal.    Psychiatric:   Mentation appears normal and affect normal/bright.         Pelvic Exam:  External Genitalia:     Normal appearance for age, no discharge present, no tenderness present, no inflammatory lesions present, color normal  Vagina:     Normal vaginal vault without central or " paravaginal defects, ATROPHIC  Bladder:     Nontender to palpation  Urethra:   Urethral Body:  Urethra palpation normal, urethra structural support normal   Urethral Meatus:  No erythema or lesions present  Cervix:     Appearance healthy, no lesions present, nontender to palpation, no bleeding present  Uterus:     Nontender to palpation, no masses present, position anteflexed, mobility: normal  Adnexa:     No adnexal tenderness present, no adnexal masses present  Perineum:     Perineum within normal limits, no evidence of trauma, no rashes or skin lesions present  Inguinal Lymph Nodes:     No lymphadenopathy present      COUNSELING:   Special attention given to:        Regular exercise       Healthy diet/nutrition    BMI: Body mass index is 27.29 kg/m .  Weight management plan: Discussed healthy diet and exercise guidelines    ASSESSMENT:  62 year old female with satisfactory annual exam.    ICD-10-CM    1. Encounter for gynecological examination without abnormal finding  Z01.419 Pap thin layer screen with HPV - recommended age 30 - 65 years   2. Generalized anxiety disorder  F41.1 venlafaxine (EFFEXOR XR) 37.5 MG 24 hr capsule   3. Moderate episode of recurrent major depressive disorder (H)  F33.1 buPROPion (WELLBUTRIN XL) 300 MG 24 hr tablet       PLAN:  62-year-old postmenopausal female with a normal exam.  She is to continue with annual mammograms.  Pap smear was collected and if it is normal she can be done with Pap screening.  We did recommend that she continue with annual exams.  Medications were refilled.    ALEX Delatorre CNP

## 2022-08-16 ENCOUNTER — OFFICE VISIT (OUTPATIENT)
Dept: OBGYN | Facility: CLINIC | Age: 62
End: 2022-08-16
Payer: COMMERCIAL

## 2022-08-16 ENCOUNTER — ANCILLARY PROCEDURE (OUTPATIENT)
Dept: MAMMOGRAPHY | Facility: CLINIC | Age: 62
End: 2022-08-16
Payer: COMMERCIAL

## 2022-08-16 VITALS
HEART RATE: 76 BPM | BODY MASS INDEX: 27.14 KG/M2 | SYSTOLIC BLOOD PRESSURE: 114 MMHG | WEIGHT: 159 LBS | DIASTOLIC BLOOD PRESSURE: 70 MMHG | HEIGHT: 64 IN

## 2022-08-16 DIAGNOSIS — F41.1 GENERALIZED ANXIETY DISORDER: ICD-10-CM

## 2022-08-16 DIAGNOSIS — F33.1 MODERATE EPISODE OF RECURRENT MAJOR DEPRESSIVE DISORDER (H): ICD-10-CM

## 2022-08-16 DIAGNOSIS — Z12.31 VISIT FOR SCREENING MAMMOGRAM: ICD-10-CM

## 2022-08-16 DIAGNOSIS — Z01.419 ENCOUNTER FOR GYNECOLOGICAL EXAMINATION WITHOUT ABNORMAL FINDING: Primary | ICD-10-CM

## 2022-08-16 PROCEDURE — 99396 PREV VISIT EST AGE 40-64: CPT | Performed by: NURSE PRACTITIONER

## 2022-08-16 PROCEDURE — 77067 SCR MAMMO BI INCL CAD: CPT | Mod: TC | Performed by: RADIOLOGY

## 2022-08-16 PROCEDURE — 87624 HPV HI-RISK TYP POOLED RSLT: CPT | Performed by: NURSE PRACTITIONER

## 2022-08-16 PROCEDURE — 77063 BREAST TOMOSYNTHESIS BI: CPT | Mod: TC | Performed by: RADIOLOGY

## 2022-08-16 PROCEDURE — G0123 SCREEN CERV/VAG THIN LAYER: HCPCS | Performed by: NURSE PRACTITIONER

## 2022-08-16 RX ORDER — VENLAFAXINE HYDROCHLORIDE 37.5 MG/1
37.5 CAPSULE, EXTENDED RELEASE ORAL DAILY
Qty: 90 CAPSULE | Refills: 3 | Status: SHIPPED | OUTPATIENT
Start: 2022-08-16 | End: 2023-08-30

## 2022-08-16 RX ORDER — BUPROPION HYDROCHLORIDE 300 MG/1
300 TABLET ORAL EVERY MORNING
Qty: 90 TABLET | Refills: 3 | Status: SHIPPED | OUTPATIENT
Start: 2022-08-16 | End: 2024-02-01

## 2022-08-16 RX ORDER — VENLAFAXINE HYDROCHLORIDE 37.5 MG/1
CAPSULE, EXTENDED RELEASE ORAL
Qty: 30 CAPSULE | Refills: 0 | OUTPATIENT
Start: 2022-08-16

## 2022-08-16 ASSESSMENT — ANXIETY QUESTIONNAIRES
7. FEELING AFRAID AS IF SOMETHING AWFUL MIGHT HAPPEN: NOT AT ALL
3. WORRYING TOO MUCH ABOUT DIFFERENT THINGS: NOT AT ALL
GAD7 TOTAL SCORE: 2
5. BEING SO RESTLESS THAT IT IS HARD TO SIT STILL: SEVERAL DAYS
GAD7 TOTAL SCORE: 2
1. FEELING NERVOUS, ANXIOUS, OR ON EDGE: SEVERAL DAYS
IF YOU CHECKED OFF ANY PROBLEMS ON THIS QUESTIONNAIRE, HOW DIFFICULT HAVE THESE PROBLEMS MADE IT FOR YOU TO DO YOUR WORK, TAKE CARE OF THINGS AT HOME, OR GET ALONG WITH OTHER PEOPLE: NOT DIFFICULT AT ALL
6. BECOMING EASILY ANNOYED OR IRRITABLE: NOT AT ALL
2. NOT BEING ABLE TO STOP OR CONTROL WORRYING: NOT AT ALL

## 2022-08-16 ASSESSMENT — PATIENT HEALTH QUESTIONNAIRE - PHQ9
5. POOR APPETITE OR OVEREATING: NOT AT ALL
SUM OF ALL RESPONSES TO PHQ QUESTIONS 1-9: 2

## 2022-08-16 NOTE — TELEPHONE ENCOUNTER
"Requested Prescriptions   Pending Prescriptions Disp Refills     venlafaxine (EFFEXOR XR) 37.5 MG 24 hr capsule [Pharmacy Med Name: VENLAFAXINE ER 37.5MG CAPSULES] 30 capsule 0     Sig: TAKE 1 CAPSULE(37.5 MG) BY MOUTH DAILY       Serotonin-Norepinephrine Reuptake Inhibitors  Failed - 8/16/2022  5:13 AM        Failed - Blood pressure under 140/90 in past 12 months     BP Readings from Last 3 Encounters:   03/26/21 126/74   07/11/19 110/64   09/25/18 118/74                 Failed - Normal serum creatinine on file in past 12 months     Recent Labs   Lab Test 03/26/21  1048   CR 0.76       Ok to refill medication if creatinine is low          Passed - Recent (12 mo) or future (30 days) visit within the authorizing provider's specialty     Patient has had an office visit with the authorizing provider or a provider within the authorizing providers department within the previous 12 mos or has a future within next 30 days. See \"Patient Info\" tab in inbasket, or \"Choose Columns\" in Meds & Orders section of the refill encounter.              Passed - Medication is active on med list        Passed - Patient is age 18 or older        Passed - No active pregnancy on record        Passed - No positive pregnancy test in past 12 months           Last Written Prescription Date:  7/22/22  Last Fill Quantity: 30,  # refills: 0   Last office visit: 3/26/2021 with prescribing provider:  Suhas     Future Office Visit:   Next 5 appointments (look out 90 days)    Aug 16, 2022  2:10 PM  PHYSICAL with ALEX Delatorre CNP  [facility] ([facility] ) [address] [id number] [phone]         rx to be addressed during appointment today.  Marlen Hussein, RN on 8/16/2022 at 5:57 AM          "

## 2022-08-19 LAB
BKR LAB AP GYN ADEQUACY: NORMAL
BKR LAB AP GYN INTERPRETATION: NORMAL
BKR LAB AP HPV REFLEX: NORMAL
BKR LAB AP PREVIOUS ABNORMAL: NORMAL
PATH REPORT.COMMENTS IMP SPEC: NORMAL
PATH REPORT.COMMENTS IMP SPEC: NORMAL
PATH REPORT.RELEVANT HX SPEC: NORMAL

## 2022-08-23 ENCOUNTER — HOSPITAL ENCOUNTER (OUTPATIENT)
Dept: MAMMOGRAPHY | Facility: CLINIC | Age: 62
Discharge: HOME | End: 2022-08-23
Attending: NURSE PRACTITIONER
Payer: COMMERCIAL

## 2022-08-23 DIAGNOSIS — R92.8 ABNORMAL MAMMOGRAM: ICD-10-CM

## 2022-08-23 LAB
HUMAN PAPILLOMA VIRUS 16 DNA: NEGATIVE
HUMAN PAPILLOMA VIRUS 18 DNA: NEGATIVE
HUMAN PAPILLOMA VIRUS FINAL DIAGNOSIS: NORMAL
HUMAN PAPILLOMA VIRUS OTHER HR: NEGATIVE

## 2022-08-23 PROCEDURE — 76642 ULTRASOUND BREAST LIMITED: CPT | Mod: LT

## 2022-10-23 ENCOUNTER — HEALTH MAINTENANCE LETTER (OUTPATIENT)
Age: 62
End: 2022-10-23

## 2023-08-30 DIAGNOSIS — F41.1 GENERALIZED ANXIETY DISORDER: ICD-10-CM

## 2023-08-30 RX ORDER — VENLAFAXINE HYDROCHLORIDE 37.5 MG/1
37.5 CAPSULE, EXTENDED RELEASE ORAL DAILY
Qty: 90 CAPSULE | Refills: 0 | Status: SHIPPED | OUTPATIENT
Start: 2023-08-30 | End: 2023-11-30

## 2023-08-30 NOTE — TELEPHONE ENCOUNTER
"Requested Prescriptions   Pending Prescriptions Disp Refills    venlafaxine (EFFEXOR XR) 37.5 MG 24 hr capsule [Pharmacy Med Name: VENLAFAXINE ER 37.5MG CAPSULES] 90 capsule 3     Sig: TAKE 1 CAPSULE(37.5 MG) BY MOUTH DAILY       Serotonin-Norepinephrine Reuptake Inhibitors  Failed - 8/30/2023  3:09 AM        Failed - Blood pressure under 140/90 in past 12 months     BP Readings from Last 3 Encounters:   08/16/22 114/70   03/26/21 126/74   07/11/19 110/64                 Failed - Recent (12 mo) or future (30 days) visit within the authorizing provider's specialty     Patient has had an office visit with the authorizing provider or a provider within the authorizing providers department within the previous 12 mos or has a future within next 30 days. See \"Patient Info\" tab in inbasket, or \"Choose Columns\" in Meds & Orders section of the refill encounter.              Failed - Normal serum creatinine on file in past 12 months     Recent Labs   Lab Test 03/26/21  1048   CR 0.76       Ok to refill medication if creatinine is low          Passed - Medication is active on med list        Passed - Patient is age 18 or older        Passed - No active pregnancy on record        Passed - No positive pregnancy test in past 12 months           Last Written Prescription Date:  8/16/22  Last Fill Quantity: 90,  # refills: 3   Last office visit: 8/16/2022 ; last virtual visit: Visit date not found with prescribing provider:  Suhas   Future Office Visit:  NONE    Medication is being filled for 1 time refill only due to:  Patient needs to be seen because it has been more than one year since last visit.  NEURONIX message sent as reminder to schedule annual appointment.  Marlen Hussein RN on 8/30/2023 at 6:02 AM            "

## 2023-11-05 ENCOUNTER — HEALTH MAINTENANCE LETTER (OUTPATIENT)
Age: 63
End: 2023-11-05

## 2023-11-13 NOTE — PROGRESS NOTES
Ann Marie is a 57 year old  female who presents for annual exam.     Besides routine health maintenance,  she would like refill on Wellbutrin, 150mg.    HPI:  The patient's PCP is Cecilia Dai . She is doing well. Reports her anxiety is under control. Recovering from wrist surgery done in September.   Last pap was in ,due for pap today. No results for colonoscopy screening (FIT ) in Epic,though she mailed it.    Denies vaginal bleeding. Is still walking her puppy daily.      GYNECOLOGIC HISTORY:    No LMP recorded. Patient is postmenopausal.  Her current contraception method is: menopause.  She  reports that she has quit smoking. She has never used smokeless tobacco.    Patient is sexually active.  STD testing offered?  Declined  Last PHQ-9 score on record =   PHQ-9 SCORE 2018   Total Score 0     Last GAD7 score on record =   RACHEL-7 SCORE 2018   Total Score 0     Alcohol Score = 2    HEALTH MAINTENANCE:  Cholesterol: (  Cholesterol   Date Value Ref Range Status   2016 182 <200 mg/dL Final   2015 163 115 - 199 mg/dL Final      Last Mammo: , Result: normal, Next Mammo: today   Pap: 2012 WNL HPV-  Colonoscopy:  iFit test done couple years ago, Result: normal, Next Colonoscopy: would like another fit test  Dexa:  2015 Osteopenia    Health maintenance updated:  yes    HISTORY:  Obstetric History       T1      L3     SAB1   TAB0   Ectopic0   Multiple0   Live Births3       # Outcome Date GA Lbr Doug/2nd Weight Sex Delivery Anes PTL Lv   4  95 31w0d  4 lb 2 oz (1.871 kg) F    KATTY      Name: Sonia   3 Term 87 39w0d  7 lb 13 oz (3.544 kg) M    KATTY      Name: Ted   2  85 35w0d  5 lb 12 oz (2.608 kg) F Vag-Vacuum   KATTY      Name: Elizabeth   1 SAB                   Patient Active Problem List   Diagnosis     Osteopenia     Depression, major, recurrent, in remission (H)     Past Surgical History:   Procedure Laterality Date      "LAPAROSCOPY DIAGNOSTIC (GYN)  1983     TONSILLECTOMY  1963      Social History   Substance Use Topics     Smoking status: Former Smoker     Smokeless tobacco: Never Used      Comment: Quit 3 years ago     Alcohol use 0.0 oz/week     0 Standard drinks or equivalent per week      Problem (# of Occurrences) Relation (Name,Age of Onset)    Alzheimer Disease (1) Father    Breast Cancer (1) Mother (87)    Colon Cancer (1) Father (78): She \"thinks\" it was colon cancer, he was living in [place] at the time.    HEART DISEASE (3) Father (76): 3 stents, Mother (89): CHF,  at age 90, Brother (55): Bilateral thigh stents age 55.  Brother also carotid artery stents age 57.    OSTEOPOROSIS (1) Mother    Thyroid Disease (1) Father: Hypothyroid    Uterine Cancer (1) Mother (88)            Current Outpatient Prescriptions   Medication Sig     buPROPion (WELLBUTRIN XL) 150 MG 24 hr tablet TAKE 1 TABLET(150 MG) BY MOUTH DAILY     venlafaxine (EFFEXOR-XR) 37.5 MG 24 hr capsule Take 1 capsule (37.5 mg) by mouth daily     hydrOXYzine (VISTARIL) 25 MG capsule Take 25 mg by mouth Reported on 2017     [DISCONTINUED] buPROPion (WELLBUTRIN XL) 300 MG 24 hr tablet TAKE 1 TABLET(300 MG) BY MOUTH DAILY (Patient taking differently: 150 mg TAKE 1 TABLET(300 MG) BY MOUTH DAILY)     [DISCONTINUED] venlafaxine (EFFEXOR-XR) 37.5 MG 24 hr capsule Take 1 capsule (37.5 mg) by mouth daily     No current facility-administered medications for this visit.      Allergies   Allergen Reactions     Metronidazole      No Clinical Screening - See Comments Rash     Penicillins Rash       Past medical, surgical, social and family histories were reviewed and updated in EPIC.    ROS:   12 point review of systems negative other than symptoms noted below.  Constitutional: Weight Gain  Head: Nasal Congestion  Genitourinary: Night Sweats  Skin: Skin Dryness  Neurologic: Headaches  Musculoskeletal: Joint Pain  Endocrine: Loss of Hair  Psychiatric: " "Anxiety    EXAM:  /74  Pulse 80  Ht 5' 4.5\" (1.638 m)  Wt 162 lb (73.5 kg)  Breastfeeding? No  BMI 27.38 kg/m2   BMI: Body mass index is 27.38 kg/(m^2).    PHYSICAL EXAM:  Constitutional:  Appearance: Well nourished, well developed, alert, in no acute distress  Neck:  Lymph Nodes:  No lymphadenopathy present    Thyroid:  Gland size normal, nontender, no nodules or masses present  on palpation  Chest:  Respiratory Effort:  Breathing unlabored  Cardiovascular:    Heart: Auscultation:  Regular rate, normal rhythm, no murmurs present  Breasts: Inspection of Breasts:  No lymphadenopathy present., Palpation of Breasts and Axillae:  No masses present on palpation, no breast tenderness. and Axillary Lymph Nodes:  No lymphadenopathy present.  Gastrointestinal:   Abdominal Examination:  Abdomen nontender to palpation, tone normal without rigidity or guarding, no masses present, umbilicus without lesions   Liver and Spleen:  No hepatomegaly present, liver nontender to palpation    Hernias:  No hernias present  Lymphatic: Lymph Nodes:  No other lymphadenopathy present  Skin:  General Inspection:  No rashes present, no lesions present, no areas of  discoloration    Genitalia and Groin:  No rashes present, no lesions present, no areas of  discoloration, no masses present  Neurologic/Psychiatric:    Mental Status:  Oriented X3     Pelvic Exam:  External Genitalia:     Normal appearance for age, no discharge present, no tenderness present, no inflammatory lesions present, color normal  Vagina:     Normal vaginal vault without central or paravaginal defects, no discharge present, no inflammatory lesions present, no masses present  Bladder:     Nontender to palpation  Urethra:   Urethral Body:  Urethra palpation normal, urethra structural support normal   Urethral Meatus:  No erythema or lesions present  Cervix:     Appearance healthy, no lesions present, nontender to palpation, no bleeding present  Uterus:     Uterus: " firm, normal sized and nontender, anteverted in position.   Adnexa:     No adnexal tenderness present, no adnexal masses present  Perineum:     Perineum within normal limits, no evidence of trauma, no rashes or skin lesions present  Anus:     Anus within normal limits, no hemorrhoids present  Inguinal Lymph Nodes:     No lymphadenopathy present  Pubic Hair:     Normal pubic hair distribution for age  Genitalia and Groin:     No rashes present, no lesions present, no areas of discoloration, no masses present      COUNSELING:   Reviewed preventive health counseling, as reflected in patient instructions       Regular exercise       Healthy diet/nutrition       Safe sex practices/STD prevention       Colon cancer screening    BMI: Body mass index is 27.38 kg/(m^2).  Weight management plan: Discussed healthy diet and exercise guidelines and patient will follow up in 12 months in clinic to re-evaluate.    ASSESSMENT:  57 year old female with satisfactory annual exam.    ICD-10-CM    1. Encounter for gynecological examination without abnormal finding Z01.419 Pap imaged thin layer screen with HPV - recommended age 30 - 65     HPV High Risk Types DNA Cervical   2. Moderate episode of recurrent major depressive disorder (H) F33.1 buPROPion (WELLBUTRIN XL) 150 MG 24 hr tablet   3. Generalized anxiety disorder F41.1 venlafaxine (EFFEXOR-XR) 37.5 MG 24 hr capsule   4. Screen for colon cancer Z12.11 Fecal colorectal cancer screen (FIT)       PLAN:    Normal Gyn exam. Annual pap screening recommended.  Pap smear done. Discussed colonoscopy screening,opted for FIT. Mammogram today.    Anxiety well managed on current medications. Refill sent for Wellbutrin.    RTC in 1 year.      ALEX Delatorre CNP   2 = A lot of assistance

## 2023-11-29 DIAGNOSIS — F41.1 GENERALIZED ANXIETY DISORDER: ICD-10-CM

## 2023-11-29 RX ORDER — VENLAFAXINE HYDROCHLORIDE 37.5 MG/1
37.5 CAPSULE, EXTENDED RELEASE ORAL DAILY
Qty: 90 CAPSULE | Refills: 0 | OUTPATIENT
Start: 2023-11-29

## 2023-11-29 NOTE — TELEPHONE ENCOUNTER
"Requested Prescriptions   Pending Prescriptions Disp Refills    venlafaxine (EFFEXOR XR) 37.5 MG 24 hr capsule [Pharmacy Med Name: VENLAFAXINE ER 37.5MG CAPSULES] 90 capsule 0     Sig: TAKE 1 CAPSULE(37.5 MG) BY MOUTH DAILY       Serotonin-Norepinephrine Reuptake Inhibitors  Failed - 11/29/2023  1:46 PM        Failed - Blood pressure under 140/90 in past 12 months     BP Readings from Last 3 Encounters:   08/16/22 114/70   03/26/21 126/74   07/11/19 110/64                 Failed - Recent (12 mo) or future (30 days) visit within the authorizing provider's specialty     Patient has had an office visit with the authorizing provider or a provider within the authorizing providers department within the previous 12 mos or has a future within next 30 days. See \"Patient Info\" tab in inbasket, or \"Choose Columns\" in Meds & Orders section of the refill encounter.              Failed - Normal serum creatinine on file in past 12 months     Recent Labs   Lab Test 03/26/21  1048   CR 0.76       Ok to refill medication if creatinine is low          Passed - Medication is active on med list        Passed - Patient is age 18 or older        Passed - No active pregnancy on record        Passed - No positive pregnancy test in past 12 months           Pt due for annual, no appt scheduled. Pt already received one month extension. Rx denied.   Tamara Gonzalez RN on 11/29/2023 at 1:58 PM      "

## 2023-11-30 RX ORDER — VENLAFAXINE HYDROCHLORIDE 37.5 MG/1
37.5 CAPSULE, EXTENDED RELEASE ORAL DAILY
Qty: 90 CAPSULE | Refills: 0 | Status: SHIPPED | OUTPATIENT
Start: 2023-11-30 | End: 2024-02-01

## 2024-01-31 PROBLEM — A04.72 C. DIFFICILE COLITIS: Status: ACTIVE | Noted: 2024-01-31

## 2024-01-31 RX ORDER — DESONIDE 0.5 MG/G
CREAM TOPICAL
COMMUNITY
Start: 2023-02-14 | End: 2024-02-01

## 2024-01-31 RX ORDER — FLUOCINONIDE TOPICAL SOLUTION USP, 0.05% 0.5 MG/ML
SOLUTION TOPICAL
COMMUNITY
Start: 2023-03-28

## 2024-02-01 ENCOUNTER — ANCILLARY PROCEDURE (OUTPATIENT)
Dept: MAMMOGRAPHY | Facility: CLINIC | Age: 64
End: 2024-02-01
Payer: COMMERCIAL

## 2024-02-01 ENCOUNTER — OFFICE VISIT (OUTPATIENT)
Dept: OBGYN | Facility: CLINIC | Age: 64
End: 2024-02-01
Payer: COMMERCIAL

## 2024-02-01 VITALS
HEIGHT: 64 IN | DIASTOLIC BLOOD PRESSURE: 70 MMHG | BODY MASS INDEX: 28.38 KG/M2 | SYSTOLIC BLOOD PRESSURE: 130 MMHG | WEIGHT: 166.2 LBS

## 2024-02-01 DIAGNOSIS — Z01.419 ENCOUNTER FOR GYNECOLOGICAL EXAMINATION WITHOUT ABNORMAL FINDING: Primary | ICD-10-CM

## 2024-02-01 DIAGNOSIS — F33.1 MODERATE EPISODE OF RECURRENT MAJOR DEPRESSIVE DISORDER (H): ICD-10-CM

## 2024-02-01 DIAGNOSIS — Z13.29 SCREENING FOR THYROID DISORDER: ICD-10-CM

## 2024-02-01 DIAGNOSIS — Z13.220 ENCOUNTER FOR LIPID SCREENING FOR CARDIOVASCULAR DISEASE: ICD-10-CM

## 2024-02-01 DIAGNOSIS — Z12.31 VISIT FOR SCREENING MAMMOGRAM: ICD-10-CM

## 2024-02-01 DIAGNOSIS — Z13.6 ENCOUNTER FOR LIPID SCREENING FOR CARDIOVASCULAR DISEASE: ICD-10-CM

## 2024-02-01 DIAGNOSIS — Z13.228 SCREENING FOR METABOLIC DISORDER: ICD-10-CM

## 2024-02-01 DIAGNOSIS — Z13.0 SCREENING FOR DISORDER OF BLOOD AND BLOOD-FORMING ORGANS: ICD-10-CM

## 2024-02-01 DIAGNOSIS — F41.1 GENERALIZED ANXIETY DISORDER: ICD-10-CM

## 2024-02-01 DIAGNOSIS — Z11.59 NEED FOR HEPATITIS C SCREENING TEST: ICD-10-CM

## 2024-02-01 DIAGNOSIS — Z11.4 SCREENING FOR HIV (HUMAN IMMUNODEFICIENCY VIRUS): ICD-10-CM

## 2024-02-01 LAB
BASOPHILS # BLD AUTO: 0.1 10E3/UL (ref 0–0.2)
BASOPHILS NFR BLD AUTO: 1 %
EOSINOPHIL # BLD AUTO: 0.2 10E3/UL (ref 0–0.7)
EOSINOPHIL NFR BLD AUTO: 4 %
ERYTHROCYTE [DISTWIDTH] IN BLOOD BY AUTOMATED COUNT: 13 % (ref 10–15)
HBA1C MFR BLD: 5.4 % (ref 0–5.6)
HCT VFR BLD AUTO: 38 % (ref 35–47)
HGB BLD-MCNC: 12.8 G/DL (ref 11.7–15.7)
IMM GRANULOCYTES # BLD: 0 10E3/UL
IMM GRANULOCYTES NFR BLD: 0 %
LYMPHOCYTES # BLD AUTO: 1.7 10E3/UL (ref 0.8–5.3)
LYMPHOCYTES NFR BLD AUTO: 31 %
MCH RBC QN AUTO: 27.4 PG (ref 26.5–33)
MCHC RBC AUTO-ENTMCNC: 33.7 G/DL (ref 31.5–36.5)
MCV RBC AUTO: 81 FL (ref 78–100)
MONOCYTES # BLD AUTO: 0.5 10E3/UL (ref 0–1.3)
MONOCYTES NFR BLD AUTO: 9 %
NEUTROPHILS # BLD AUTO: 3.1 10E3/UL (ref 1.6–8.3)
NEUTROPHILS NFR BLD AUTO: 55 %
PLATELET # BLD AUTO: 309 10E3/UL (ref 150–450)
RBC # BLD AUTO: 4.68 10E6/UL (ref 3.8–5.2)
WBC # BLD AUTO: 5.6 10E3/UL (ref 4–11)

## 2024-02-01 PROCEDURE — 36415 COLL VENOUS BLD VENIPUNCTURE: CPT | Performed by: NURSE PRACTITIONER

## 2024-02-01 PROCEDURE — 85025 COMPLETE CBC W/AUTO DIFF WBC: CPT | Performed by: NURSE PRACTITIONER

## 2024-02-01 PROCEDURE — 84443 ASSAY THYROID STIM HORMONE: CPT | Performed by: NURSE PRACTITIONER

## 2024-02-01 PROCEDURE — 87389 HIV-1 AG W/HIV-1&-2 AB AG IA: CPT | Performed by: NURSE PRACTITIONER

## 2024-02-01 PROCEDURE — 80061 LIPID PANEL: CPT | Performed by: NURSE PRACTITIONER

## 2024-02-01 PROCEDURE — 86803 HEPATITIS C AB TEST: CPT | Performed by: NURSE PRACTITIONER

## 2024-02-01 PROCEDURE — 80053 COMPREHEN METABOLIC PANEL: CPT | Performed by: NURSE PRACTITIONER

## 2024-02-01 PROCEDURE — 77063 BREAST TOMOSYNTHESIS BI: CPT | Mod: TC | Performed by: RADIOLOGY

## 2024-02-01 PROCEDURE — 99397 PER PM REEVAL EST PAT 65+ YR: CPT | Performed by: NURSE PRACTITIONER

## 2024-02-01 PROCEDURE — 83036 HEMOGLOBIN GLYCOSYLATED A1C: CPT | Performed by: NURSE PRACTITIONER

## 2024-02-01 PROCEDURE — 77067 SCR MAMMO BI INCL CAD: CPT | Mod: TC | Performed by: RADIOLOGY

## 2024-02-01 RX ORDER — VENLAFAXINE HYDROCHLORIDE 37.5 MG/1
37.5 CAPSULE, EXTENDED RELEASE ORAL DAILY
Qty: 90 CAPSULE | Refills: 3 | Status: SHIPPED | OUTPATIENT
Start: 2024-02-01

## 2024-02-01 RX ORDER — BUPROPION HYDROCHLORIDE 300 MG/1
300 TABLET ORAL EVERY MORNING
Qty: 90 TABLET | Refills: 3 | Status: SHIPPED | OUTPATIENT
Start: 2024-02-01

## 2024-02-01 RX ORDER — MUPIROCIN 20 MG/G
OINTMENT TOPICAL
COMMUNITY
Start: 2023-08-29 | End: 2024-02-01

## 2024-02-01 ASSESSMENT — ANXIETY QUESTIONNAIRES
3. WORRYING TOO MUCH ABOUT DIFFERENT THINGS: NOT AT ALL
6. BECOMING EASILY ANNOYED OR IRRITABLE: NOT AT ALL
GAD7 TOTAL SCORE: 0
5. BEING SO RESTLESS THAT IT IS HARD TO SIT STILL: NOT AT ALL
2. NOT BEING ABLE TO STOP OR CONTROL WORRYING: NOT AT ALL
IF YOU CHECKED OFF ANY PROBLEMS ON THIS QUESTIONNAIRE, HOW DIFFICULT HAVE THESE PROBLEMS MADE IT FOR YOU TO DO YOUR WORK, TAKE CARE OF THINGS AT HOME, OR GET ALONG WITH OTHER PEOPLE: NOT DIFFICULT AT ALL
1. FEELING NERVOUS, ANXIOUS, OR ON EDGE: NOT AT ALL
7. FEELING AFRAID AS IF SOMETHING AWFUL MIGHT HAPPEN: NOT AT ALL
GAD7 TOTAL SCORE: 0

## 2024-02-01 ASSESSMENT — PATIENT HEALTH QUESTIONNAIRE - PHQ9
5. POOR APPETITE OR OVEREATING: NOT AT ALL
SUM OF ALL RESPONSES TO PHQ QUESTIONS 1-9: 2

## 2024-02-01 NOTE — PROGRESS NOTES
Ann Marie is a 63 year old  female who presents for annual exam.     Besides routine health maintenance, she has no other health concerns today .    Do you have a Health Care Directive?: No, advance care planning information given to patient to review.  Patient declined advance care planning discussion at this time.    Fall risk:   Fallen 2 or more times in the past year?: No    HPI:  The patient's PCP is [facility].      Patient here today for her annual Wellwoman exam and mammogram.  She is feeling well but is grieving the loss of her father-in-law.  She feels good at a mental health standpoint and likes where she is with her medication.  She is fasting and we will complete labs today.  She has had no vaginal bleeding.  She is on no hormone replacement therapy.  She has had some bladder instability and urge incontinence but nothing necessitating a pad on a daily basis.    She needs no further Pap screening.    She had a negative Cologuard in  and will be due next year.    GYNECOLOGIC HISTORY:  No LMP recorded. Patient is postmenopausal..   reports that she has quit smoking. She has never used smokeless tobacco.    Patient is sexually active.  STD testing offered?  Declined  Last PHQ-9 score on record=       2024    11:40 AM   PHQ-9 SCORE   PHQ-9 Total Score 2     Last GAD7 score on record=       2022     4:20 PM 2022     2:22 PM 2024    11:40 AM   RACHEL-7 SCORE   Total Score 7 2 0     Alcohol Score = 2    HEALTH MAINTENANCE:  Cholesterol:   Recent Labs   Lab Test 19  1039 16  1334   CHOL 195 182   HDL 66 74   LDL 99 74   TRIG 149 168*     Last Mammo:  2022 left breast us , Result: Normal, Next Mammo: Today   Pap:   Lab Results   Component Value Date    GYNINTERP  2022     Negative for Intraepithelial Lesion or Malignancy (NILM)    PAP NIL 2019    PAP NIL 2018     DEXA:  2019  Colonoscopy:  2021 cologuard, Result:   "Normal, Next Colonoscopy: 3 years.    Health maintenance updated:  yes    HISTORY:  OB History    Para Term  AB Living   4 3 1 2 1 3   SAB IAB Ectopic Multiple Live Births   1 0 0 0 3      # Outcome Date GA Lbr Doug/2nd Weight Sex Delivery Anes PTL Lv   4  95 31w0d  1.871 kg (4 lb 2 oz) F    KATTY      Name: Sonia Betancourt Term 87 39w0d  3.544 kg (7 lb 13 oz) M    KATTY      Name: Ted   2  85 35w0d  2.608 kg (5 lb 12 oz) F Vag-Vacuum   KATTY      Birth Comments: PPROM.  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      Name: Elizabeth   1 SAB              Patient Active Problem List   Diagnosis    Osteopenia    Depression, major, recurrent, in remission (H24)    C. difficile colitis     Past Surgical History:   Procedure Laterality Date    LAPAROSCOPY DIAGNOSTIC (GYN)  1983    OR PRP Right 2021    TONSILLECTOMY  1963      Social History     Tobacco Use    Smoking status: Former    Smokeless tobacco: Never    Tobacco comments:     Quit 3 years ago   Substance Use Topics    Alcohol use: Yes     Alcohol/week: 0.0 standard drinks of alcohol     Comment: Occas       Problem (# of Occurrences) Relation (Name,Age of Onset)    Alzheimer Disease (1) Father    Heart Disease (3) Father (76): 3 stents, Mother (89): CHF,  at age 90, Brother (55): Bilateral thigh stents age 55.  Brother also carotid artery stents age 57.    Osteoporosis (1) Mother    Thyroid Disease (1) Father: Hypothyroid    Breast Cancer (1) Mother (87)    Colon Cancer (1) Father (78): She \"thinks\" it was colon cancer, he was living in [place] at the time.    Uterine Cancer (1) Mother (88)              Current Outpatient Medications   Medication Sig    buPROPion (WELLBUTRIN XL) 300 MG 24 hr tablet Take 1 tablet (300 mg) by mouth every morning    fluocinonide (LIDEX) 0.05 % external solution APPLY 10 DROPS TO AFFCTED AREAS OF SCALP TWICE DAILY UNTIL CLEAR THEN AS NEEDED    venlafaxine (EFFEXOR XR) 37.5 MG " "24 hr capsule Take 1 capsule (37.5 mg) by mouth daily     No current facility-administered medications for this visit.       Allergies   Allergen Reactions    Metronidazole     No Clinical Screening - See Comments Rash    Penicillins Rash       Past medical, surgical, social and family history were reviewed and updated in EPIC.    ROS:   12 point review of systems negative other than symptoms noted below or in the HPI.  POSITIVE for:, stress incontinence    EXAM:  /70   Ht 1.619 m (5' 3.75\")   Wt 75.4 kg (166 lb 3.2 oz)   Breastfeeding No   BMI 28.75 kg/m     BMI: Body mass index is 28.75 kg/m .    EXAM:  Constitutional: Appearance: Well nourished, well developed alert, in no acute distress  Chest:  Respiratory Effort:  Breathing unlabored  Cardiovascular:Heart    Auscultation:  Regular rate, normal rhythm, no murmurs present  Breasts: Inspection of Breasts:  No lymphadenopathy present., Palpation of Breasts and Axillae:  No masses present on palpation, no breast tenderness., Axillary Lymph Nodes:  No lymphadenopathy present., and No nodularity, asymmetry or nipple discharge bilaterally.  Gastrointestinal:  Abdominal Examination:  Abdomen nontender to palpation, tone normal without     rigidity or guarding, no masses present, umbilicus without lesions    Liver and speen:  No hepatomegaly present, liver nontender to palpation    Hernias:  No hernias present  Lymphatic: Lymph Nodes:  No other lymphadenopathy present  Skin:  General Inspection:  No rashes present, no lesions present, no areas of  discoloration.    Genitalia and Groin:  No rashes present, no lesions present, no areas of  discoloration, no masses present  Neurologic/Psychiatric:    Mental Status:  Oriented X3     Pelvic Exam:  External Genitalia:     Normal appearance for age, no discharge present, no tenderness present, no inflammatory lesions present, color normal  Vagina:     Normal vaginal vault without central or paravaginal defects, " ATROPHIC  Bladder:     Nontender to palpation  Urethra:   Urethral Body:  Urethra palpation normal, urethra structural support normal   Urethral Meatus:  No erythema or lesions present  Cervix:     Appearance healthy, no lesions present, nontender to palpation, no bleeding present  Uterus:     Nontender to palpation, no masses present, position anteflexed, mobility: normal  Adnexa:     No adnexal tenderness present, no adnexal masses present  Perineum:     Perineum within normal limits, no evidence of trauma, no rashes or skin lesions present  Inguinal Lymph Nodes:     No lymphadenopathy present    COUNSELING:   Special attention given to:       Regular exercise       Healthy diet/nutrition       Osteoporosis prevention/bone health       (Maria R)menopause management    BMI:  Body mass index is 28.75 kg/m .  Weight management plan: Discussed healthy diet and exercise guidelines   reports that she has quit smoking. She has never used smokeless tobacco.      ASSESSMENT:  63 year old female with satisfactory annual exam.    ICD-10-CM    1. Encounter for gynecological examination without abnormal finding  Z01.419       2. Moderate episode of recurrent major depressive disorder (H)  F33.1 buPROPion (WELLBUTRIN XL) 300 MG 24 hr tablet      3. Generalized anxiety disorder  F41.1 venlafaxine (EFFEXOR XR) 37.5 MG 24 hr capsule      4. Screening for thyroid disorder  Z13.29 TSH with free T4 reflex      5. Screening for metabolic disorder  Z13.228 Comprehensive metabolic panel     Hemoglobin A1c      6. Encounter for lipid screening for cardiovascular disease  Z13.220 Lipid Profile    Z13.6       7. Screening for disorder of blood and blood-forming organs  Z13.0 CBC with Platelets & Differential      8. Screening for HIV (human immunodeficiency virus)  Z11.4 HIV Antigen Antibody Combo      9. Need for hepatitis C screening test  Z11.59 Hepatitis C Screen Reflex to HCV RNA Quant and Genotype          PLAN:  63-year-old  postmenopausal female with a normal postmenopausal GYN exam.  She needs no further Pap screening.  Labs to be completed today.  Medication was refilled and we have recommended that she continue with annual mammograms.  Kegel exercises and core strengthening were discussed with her.  We have asked her to increase her water intake.    ALEX Delatorre CNP

## 2024-02-02 LAB
ALBUMIN SERPL BCG-MCNC: 4.4 G/DL (ref 3.5–5.2)
ALP SERPL-CCNC: 104 U/L (ref 40–150)
ALT SERPL W P-5'-P-CCNC: 23 U/L (ref 0–50)
ANION GAP SERPL CALCULATED.3IONS-SCNC: 9 MMOL/L (ref 7–15)
AST SERPL W P-5'-P-CCNC: 25 U/L (ref 0–45)
BILIRUB SERPL-MCNC: 0.4 MG/DL
BUN SERPL-MCNC: 9.9 MG/DL (ref 8–23)
CALCIUM SERPL-MCNC: 9.3 MG/DL (ref 8.8–10.2)
CHLORIDE SERPL-SCNC: 103 MMOL/L (ref 98–107)
CHOLEST SERPL-MCNC: 230 MG/DL
CREAT SERPL-MCNC: 0.78 MG/DL (ref 0.51–0.95)
DEPRECATED HCO3 PLAS-SCNC: 27 MMOL/L (ref 22–29)
EGFRCR SERPLBLD CKD-EPI 2021: 84 ML/MIN/1.73M2
FASTING STATUS PATIENT QL REPORTED: YES
GLUCOSE SERPL-MCNC: 93 MG/DL (ref 70–99)
HCV AB SERPL QL IA: NONREACTIVE
HDLC SERPL-MCNC: 68 MG/DL
HIV 1+2 AB+HIV1 P24 AG SERPL QL IA: NONREACTIVE
LDLC SERPL CALC-MCNC: 130 MG/DL
NONHDLC SERPL-MCNC: 162 MG/DL
POTASSIUM SERPL-SCNC: 4.7 MMOL/L (ref 3.4–5.3)
PROT SERPL-MCNC: 6.7 G/DL (ref 6.4–8.3)
SODIUM SERPL-SCNC: 139 MMOL/L (ref 135–145)
TRIGL SERPL-MCNC: 159 MG/DL
TSH SERPL DL<=0.005 MIU/L-ACNC: 1.94 UIU/ML (ref 0.3–4.2)

## 2025-03-11 DIAGNOSIS — F41.1 GENERALIZED ANXIETY DISORDER: ICD-10-CM

## 2025-03-11 RX ORDER — VENLAFAXINE HYDROCHLORIDE 37.5 MG/1
37.5 CAPSULE, EXTENDED RELEASE ORAL DAILY
Qty: 90 CAPSULE | Refills: 0 | Status: SHIPPED | OUTPATIENT
Start: 2025-03-11

## 2025-03-11 NOTE — TELEPHONE ENCOUNTER
Requested Prescriptions   Pending Prescriptions Disp Refills    venlafaxine (EFFEXOR XR) 37.5 MG 24 hr capsule [Pharmacy Med Name: VENLAFAXINE ER 37.5MG CAPSULES] 90 capsule 3     Sig: TAKE 1 CAPSULE(37.5 MG) BY MOUTH DAILY       Serotonin-Norepinephrine Reuptake Inhibitors  Failed - 3/11/2025  1:14 PM        Failed - Most recent blood pressure under 140/90 in past 12 months     BP Readings from Last 3 Encounters:   02/01/24 130/70   08/16/22 114/70   03/26/21 126/74       No data recorded            Failed - RACHEL-7 score on file during last 12 months        Passed - Medication is active on med list and the sig matches. RN to manually verify dose and sig if red X/fail.     If the protocol passes (green check), you do not need to verify med dose and sig.    A prescription matches if they are the same clinical intention.    For Example: once daily and every morning are the same.    The protocol can not identify upper and lower case letters as matching and will fail.     For Example: Take 1 tablet (50 mg) by mouth daily     TAKE 1 TABLET (50 MG) BY MOUTH DAILY    For all fails (red x), verify dose and sig.    If the refill does match what is on file, the RN can still proceed to approve the refill request.       If they do not match, route to the appropriate provider.             Passed - Recent (12 mo) or future (90 days) visit within the authorizing provider's specialty     The patient must have completed an in-person or virtual visit within the past 12 months or has a future visit scheduled within the next 90 days with the authorizing provider s specialty.  Urgent care and e-visits do not qualify as an office visit for this protocol.          Passed - Medication indicated for associated diagnosis     Medication is associated with one or more of the following diagnoses:  Anxiety  Bipolar  Chronic musculoskeletal pain  Depression  Fibromyalgia  Headache  Migraine  Neuropathy  Obsessive compulsive disorder  Panic  disorder  Social phobia  Mood disorder  Menopause  Hot flashes/Menopausal flushing  Fibromyitis  Flushing          Passed - Patient is age 18 or older        Passed - No active pregnancy on record        Passed - No positive pregnancy test in past 12 months           Prescription approved per [facility] Refill Protocol.  Appt scheduled  Tamara Gonzalez RN on 3/11/2025 at 1:14 PM

## 2025-03-23 ENCOUNTER — HEALTH MAINTENANCE LETTER (OUTPATIENT)
Age: 65
End: 2025-03-23

## 2025-04-09 DIAGNOSIS — F33.1 MODERATE EPISODE OF RECURRENT MAJOR DEPRESSIVE DISORDER (H): ICD-10-CM

## 2025-04-09 RX ORDER — BUPROPION HYDROCHLORIDE 300 MG/1
300 TABLET ORAL EVERY MORNING
Qty: 90 TABLET | Refills: 0 | Status: SHIPPED | OUTPATIENT
Start: 2025-04-09

## 2025-04-09 NOTE — TELEPHONE ENCOUNTER
Requested Prescriptions   Pending Prescriptions Disp Refills    buPROPion (WELLBUTRIN XL) 300 MG 24 hr tablet [Pharmacy Med Name: BUPROPION XL 300MG TABLETS] 90 tablet 3     Sig: TAKE 1 TABLET(300 MG) BY MOUTH EVERY MORNING       Rx Protocol Bupropion Failed - 4/9/2025  2:52 PM        Failed - PHQ-9 score of less than 5 in past 6 months     Please review last PHQ-9 score.       3/26/2021    10:12 AM 8/16/2022     2:22 PM 2/1/2024    11:40 AM   PHQ-9 SCORE   PHQ-9 Total Score 1 2 2             Failed - Blood pressure on file in the past 12 months        Passed - Medication is active on med list and the sig matches. RN to manually verify dose and sig if red X/fail.     If the protocol passes (green check), you do not need to verify med dose and sig.    A prescription matches if they are the same clinical intention.    For Example: once daily and every morning are the same.    The protocol can not identify upper and lower case letters as matching and will fail.     For Example: Take 1 tablet (50 mg) by mouth daily     TAKE 1 TABLET (50 MG) BY MOUTH DAILY    For all fails (red x), verify dose and sig.    If the refill does match what is on file, the RN can still proceed to approve the refill request.       If they do not match, route to the appropriate provider.             Passed - Recent (12 mo) or future (90 days) visit within the authorizing provider's specialty     The patient must have completed an in-person or virtual visit within the past 12 months or has a future visit scheduled within the next 90 days with the authorizing provider s specialty.  Urgent care and e-visits do not qualify as an office visit for this protocol.          Passed - Medication is indicated for associated diagnosis     Medication is associated with one or more of the following diagnoses:  Anxiety  Bipolar Disorder  Depression  Smoking Cessation  Adjustment disorder with mixed anxiety and depressed mood  Adjustment disorder with  anxiety  Tobacco user  Adjustment disorder with anxious mood  Attention deficit hyperactivity disorder          Passed - Patient is age 18 or older        Passed - No active pregnancy on record        Passed - No positive pregnancy test in past 12 months           Last Written Prescription Date:  2/1/24  Last Fill Quantity: 90,  # refills: 3   Last office visit: 2/1/2024 ; last virtual visit: Visit date not found with prescribing provider:  Cecilia Dai NP   Future Office Visit:  4/29/25 w SM    Prescription approved per [facility] Refill Protocol.  Cecilia Armstrong RN on 4/9/2025 at 2:52 PM

## 2025-04-13 ENCOUNTER — HEALTH MAINTENANCE LETTER (OUTPATIENT)
Age: 65
End: 2025-04-13

## 2025-06-09 DIAGNOSIS — F41.1 GENERALIZED ANXIETY DISORDER: ICD-10-CM

## 2025-06-09 RX ORDER — VENLAFAXINE HYDROCHLORIDE 37.5 MG/1
37.5 CAPSULE, EXTENDED RELEASE ORAL DAILY
Qty: 90 CAPSULE | Refills: 0 | OUTPATIENT
Start: 2025-06-09

## 2025-06-09 NOTE — TELEPHONE ENCOUNTER
Requested Prescriptions   Pending Prescriptions Disp Refills    venlafaxine (EFFEXOR XR) 37.5 MG 24 hr capsule [Pharmacy Med Name: VENLAFAXINE ER 37.5MG CAPSULES] 90 capsule 0     Sig: TAKE 1 CAPSULE(37.5 MG) BY MOUTH DAILY       Serotonin-Norepinephrine Reuptake Inhibitors  Failed - 6/9/2025 11:02 AM        Failed - Most recent blood pressure under 140/90 in past 12 months     BP Readings from Last 3 Encounters:   02/01/24 130/70   08/16/22 114/70   03/26/21 126/74       No data recorded            Failed - Recent (12 month) or future (90 days) visit with authorizing provider's specialty (provided they have been seen in the past 15 months)     The patient must have completed an in-person or virtual visit within the past 12 months or has a future visit scheduled within the next 90 days with the authorizing provider s specialty.  Urgent care and e-visits do not qualify as an office visit for this protocol.          Failed - RACHEL-7 score on file during last 12 months     Please review last RACHEL-7 score.       1/18/2022     4:20 PM 8/16/2022     2:22 PM 2/1/2024    11:40 AM   RACHEL-7 SCORE   Total Score 7 2 0             Passed - Medication is active on med list and the sig matches. RN to manually verify dose and sig if red X/fail.     If the protocol passes (green check), you do not need to verify med dose and sig.    A prescription matches if they are the same clinical intention.    For Example: once daily and every morning are the same.    The protocol can not identify upper and lower case letters as matching and will fail.     For Example: Take 1 tablet (50 mg) by mouth daily     TAKE 1 TABLET (50 MG) BY MOUTH DAILY    For all fails (red x), verify dose and sig.    If the refill does match what is on file, the RN can still proceed to approve the refill request.       If they do not match, route to the appropriate provider.             Passed - Medication indicated for associated diagnosis     Medication is associated  with one or more of the following diagnoses:  Anxiety  Bipolar  Chronic musculoskeletal pain  Depression  Fibromyalgia  Headache  Migraine  Neuropathy  Obsessive compulsive disorder  Panic disorder  Social phobia  Mood disorder  Menopause  Hot flashes/Menopausal flushing  Fibromyitis  Flushing          Passed - Patient is age 18 or older        Passed - No active pregnancy on record        Passed - No positive pregnancy test in past 12 months           Last Written Prescription Date:  3/11/25  Last Fill Quantity: 90,  # refills: 0   Last office visit: 2/1/2024 ; last virtual visit: Visit date not found with prescribing provider:  Cecilia Dai NP    Future Office Visit:  none    Rx denied. Pt had appt 4/29/25, refill approved to get to appt - pt cancelled appt and no further appts scheduled.    Needs appt for further refills    Leonila Kraus RN on 6/9/2025 at 11:03 AM  [facility] Triage